# Patient Record
Sex: FEMALE | Race: BLACK OR AFRICAN AMERICAN | NOT HISPANIC OR LATINO | URBAN - METROPOLITAN AREA
[De-identification: names, ages, dates, MRNs, and addresses within clinical notes are randomized per-mention and may not be internally consistent; named-entity substitution may affect disease eponyms.]

---

## 2017-04-04 ENCOUNTER — INPATIENT (INPATIENT)
Facility: HOSPITAL | Age: 27
LOS: 6 days | Discharge: HOME | End: 2017-04-11
Attending: OBSTETRICS & GYNECOLOGY | Admitting: OBSTETRICS & GYNECOLOGY
Payer: MEDICAID

## 2017-04-04 VITALS
TEMPERATURE: 98 F | HEART RATE: 92 BPM | OXYGEN SATURATION: 100 % | DIASTOLIC BLOOD PRESSURE: 90 MMHG | RESPIRATION RATE: 16 BRPM | SYSTOLIC BLOOD PRESSURE: 140 MMHG

## 2017-04-04 DIAGNOSIS — O47.03 FALSE LABOR BEFORE 37 COMPLETED WEEKS OF GESTATION, THIRD TRIMESTER: ICD-10-CM

## 2017-04-04 LAB
ABO RH CONFIRMATION: SIGNIFICANT CHANGE UP
ALBUMIN SERPL ELPH-MCNC: 1.9 G/DL — LOW (ref 3.3–5.2)
ALP SERPL-CCNC: 392 U/L — HIGH (ref 40–120)
ALT FLD-CCNC: 32 U/L — SIGNIFICANT CHANGE UP
ANION GAP SERPL CALC-SCNC: 11 MMOL/L — SIGNIFICANT CHANGE UP (ref 5–17)
APPEARANCE UR: CLEAR — SIGNIFICANT CHANGE UP
APTT BLD: 35.3 SEC — SIGNIFICANT CHANGE UP (ref 27.5–37.4)
AST SERPL-CCNC: 46 U/L — HIGH
BACTERIA # UR AUTO: ABNORMAL
BASE EXCESS BLDCOA CALC-SCNC: -6.8 MMOL/L — SIGNIFICANT CHANGE UP (ref -11.6–0.4)
BASE EXCESS BLDCOV CALC-SCNC: -1.5 MMOL/L — SIGNIFICANT CHANGE UP (ref -9.3–0.3)
BASOPHILS # BLD AUTO: 0 K/UL — SIGNIFICANT CHANGE UP (ref 0–0.2)
BASOPHILS NFR BLD AUTO: 0.3 % — SIGNIFICANT CHANGE UP (ref 0–2)
BILIRUB SERPL-MCNC: 0.1 MG/DL — LOW (ref 0.4–2)
BILIRUB UR-MCNC: NEGATIVE — SIGNIFICANT CHANGE UP
BLD GP AB SCN SERPL QL: SIGNIFICANT CHANGE UP
BUN SERPL-MCNC: 16 MG/DL — SIGNIFICANT CHANGE UP (ref 8–20)
CALCIUM SERPL-MCNC: 8.8 MG/DL — SIGNIFICANT CHANGE UP (ref 8.6–10.2)
CHLORIDE SERPL-SCNC: 101 MMOL/L — SIGNIFICANT CHANGE UP (ref 98–107)
CO2 SERPL-SCNC: 22 MMOL/L — SIGNIFICANT CHANGE UP (ref 22–29)
COLOR SPEC: SIGNIFICANT CHANGE UP
CREAT SERPL-MCNC: 0.71 MG/DL — SIGNIFICANT CHANGE UP (ref 0.5–1.3)
DIFF PNL FLD: ABNORMAL
EOSINOPHIL # BLD AUTO: 0.1 K/UL — SIGNIFICANT CHANGE UP (ref 0–0.5)
EOSINOPHIL NFR BLD AUTO: 1.3 % — SIGNIFICANT CHANGE UP (ref 0–6)
EPI CELLS # UR: ABNORMAL
FIBRINOGEN PPP-MCNC: 634 MG/DL — HIGH (ref 310–510)
GAS PNL BLDCOV: 7.24 — SIGNIFICANT CHANGE UP (ref 7.11–7.36)
GLUCOSE SERPL-MCNC: 62 MG/DL — LOW (ref 70–115)
GLUCOSE UR QL: NEGATIVE MG/DL — SIGNIFICANT CHANGE UP
HBV SURFACE AG SERPL QL IA: SIGNIFICANT CHANGE UP
HCO3 BLDCOA-SCNC: 21 MMOL/L — SIGNIFICANT CHANGE UP (ref 15–27)
HCO3 BLDCOV-SCNC: 27 MMOL/L — HIGH (ref 17–25)
HCT VFR BLD CALC: 42 % — SIGNIFICANT CHANGE UP (ref 37–47)
HGB BLD-MCNC: 14.3 G/DL — SIGNIFICANT CHANGE UP (ref 12–16)
HIV 1 & 2 AB SERPL IA.RAPID: SIGNIFICANT CHANGE UP
INR BLD: 0.81 RATIO — LOW (ref 0.88–1.16)
KETONES UR-MCNC: NEGATIVE — SIGNIFICANT CHANGE UP
LDH SERPL L TO P-CCNC: 305 U/L — HIGH (ref 98–192)
LEUKOCYTE ESTERASE UR-ACNC: ABNORMAL
LYMPHOCYTES # BLD AUTO: 2.6 K/UL — SIGNIFICANT CHANGE UP (ref 1–4.8)
LYMPHOCYTES # BLD AUTO: 35.1 % — SIGNIFICANT CHANGE UP (ref 20–55)
MCHC RBC-ENTMCNC: 28.8 PG — SIGNIFICANT CHANGE UP (ref 27–31)
MCHC RBC-ENTMCNC: 34 G/DL — SIGNIFICANT CHANGE UP (ref 32–36)
MCV RBC AUTO: 84.7 FL — SIGNIFICANT CHANGE UP (ref 81–99)
MONOCYTES # BLD AUTO: 0.9 K/UL — HIGH (ref 0–0.8)
MONOCYTES NFR BLD AUTO: 12.3 % — HIGH (ref 3–10)
NEUTROPHILS # BLD AUTO: 3.8 K/UL — SIGNIFICANT CHANGE UP (ref 1.8–8)
NEUTROPHILS NFR BLD AUTO: 50.9 % — SIGNIFICANT CHANGE UP (ref 37–73)
NITRITE UR-MCNC: NEGATIVE — SIGNIFICANT CHANGE UP
PCO2 BLDCOA: 53.9 MMHG — SIGNIFICANT CHANGE UP (ref 32.2–65.8)
PCO2 BLDCOV: 63.2 MMHG — HIGH (ref 27–49.4)
PH BLDCOA: 7.21 — SIGNIFICANT CHANGE UP (ref 7.11–7.36)
PH UR: 6.5 — SIGNIFICANT CHANGE UP (ref 4.8–8)
PLATELET # BLD AUTO: 286 K/UL — SIGNIFICANT CHANGE UP (ref 150–400)
PO2 BLDCOA: 14.2 MMHG — LOW (ref 17.4–41)
PO2 BLDCOA: 21 MMHG — SIGNIFICANT CHANGE UP (ref 6–30)
POTASSIUM SERPL-MCNC: 4.1 MMOL/L — SIGNIFICANT CHANGE UP (ref 3.5–5.3)
POTASSIUM SERPL-SCNC: 4.1 MMOL/L — SIGNIFICANT CHANGE UP (ref 3.5–5.3)
PROT SERPL-MCNC: 6.7 G/DL — SIGNIFICANT CHANGE UP (ref 6.6–8.7)
PROT UR-MCNC: 500 MG/DL
PROTHROM AB SERPL-ACNC: 8.9 SEC — LOW (ref 9.8–12.7)
RBC # BLD: 4.96 M/UL — SIGNIFICANT CHANGE UP (ref 4.4–5.2)
RBC # FLD: 14.3 % — SIGNIFICANT CHANGE UP (ref 11–15.6)
RBC CASTS # UR COMP ASSIST: SIGNIFICANT CHANGE UP /HPF (ref 0–4)
SAO2 % BLDCOA: SIGNIFICANT CHANGE UP
SAO2 % BLDCOV: SIGNIFICANT CHANGE UP
SODIUM SERPL-SCNC: 134 MMOL/L — LOW (ref 135–145)
SP GR SPEC: 1.01 — SIGNIFICANT CHANGE UP (ref 1.01–1.02)
URATE SERPL-MCNC: 5.7 MG/DL — SIGNIFICANT CHANGE UP (ref 2.4–5.7)
UROBILINOGEN FLD QL: NEGATIVE MG/DL — SIGNIFICANT CHANGE UP
WBC # BLD: 7.5 K/UL — SIGNIFICANT CHANGE UP (ref 4.8–10.8)
WBC # FLD AUTO: 7.5 K/UL — SIGNIFICANT CHANGE UP (ref 4.8–10.8)
WBC UR QL: SIGNIFICANT CHANGE UP

## 2017-04-04 PROCEDURE — 88307 TISSUE EXAM BY PATHOLOGIST: CPT | Mod: 26

## 2017-04-04 RX ORDER — ONDANSETRON 8 MG/1
4 TABLET, FILM COATED ORAL EVERY 6 HOURS
Qty: 0 | Refills: 0 | Status: DISCONTINUED | OUTPATIENT
Start: 2017-04-06 | End: 2017-04-11

## 2017-04-04 RX ORDER — OXYCODONE HYDROCHLORIDE 5 MG/1
10 TABLET ORAL
Qty: 0 | Refills: 0 | Status: DISCONTINUED | OUTPATIENT
Start: 2017-04-06 | End: 2017-04-11

## 2017-04-04 RX ORDER — LABETALOL HCL 100 MG
20 TABLET ORAL ONCE
Qty: 0 | Refills: 0 | Status: COMPLETED | OUTPATIENT
Start: 2017-04-04 | End: 2017-04-04

## 2017-04-04 RX ORDER — HYDRALAZINE HCL 50 MG
10 TABLET ORAL ONCE
Qty: 0 | Refills: 0 | Status: COMPLETED | OUTPATIENT
Start: 2017-04-04 | End: 2017-04-04

## 2017-04-04 RX ORDER — NALOXONE HYDROCHLORIDE 4 MG/.1ML
0.1 SPRAY NASAL
Qty: 0 | Refills: 0 | Status: DISCONTINUED | OUTPATIENT
Start: 2017-04-06 | End: 2017-04-11

## 2017-04-04 RX ORDER — MAGNESIUM SULFATE 500 MG/ML
4 VIAL (ML) INJECTION ONCE
Qty: 0 | Refills: 0 | Status: COMPLETED | OUTPATIENT
Start: 2017-04-04 | End: 2017-04-04

## 2017-04-04 RX ORDER — SODIUM CHLORIDE 9 MG/ML
1000 INJECTION, SOLUTION INTRAVENOUS
Qty: 0 | Refills: 0 | Status: DISCONTINUED | OUTPATIENT
Start: 2017-04-06 | End: 2017-04-06

## 2017-04-04 RX ORDER — FERROUS SULFATE 325(65) MG
325 TABLET ORAL DAILY
Qty: 0 | Refills: 0 | Status: DISCONTINUED | OUTPATIENT
Start: 2017-04-06 | End: 2017-04-11

## 2017-04-04 RX ORDER — SIMETHICONE 80 MG/1
80 TABLET, CHEWABLE ORAL EVERY 4 HOURS
Qty: 0 | Refills: 0 | Status: DISCONTINUED | OUTPATIENT
Start: 2017-04-06 | End: 2017-04-11

## 2017-04-04 RX ORDER — TETANUS TOXOID, REDUCED DIPHTHERIA TOXOID AND ACELLULAR PERTUSSIS VACCINE, ADSORBED 5; 2.5; 8; 8; 2.5 [IU]/.5ML; [IU]/.5ML; UG/.5ML; UG/.5ML; UG/.5ML
0.5 SUSPENSION INTRAMUSCULAR ONCE
Qty: 0 | Refills: 0 | Status: COMPLETED | OUTPATIENT
Start: 2017-04-06

## 2017-04-04 RX ORDER — ACETAMINOPHEN 500 MG
1000 TABLET ORAL ONCE
Qty: 0 | Refills: 0 | Status: DISCONTINUED | OUTPATIENT
Start: 2017-04-06 | End: 2017-04-11

## 2017-04-04 RX ORDER — SODIUM CHLORIDE 9 MG/ML
1000 INJECTION, SOLUTION INTRAVENOUS
Qty: 0 | Refills: 0 | Status: DISCONTINUED | OUTPATIENT
Start: 2017-04-04 | End: 2017-04-06

## 2017-04-04 RX ORDER — OXYTOCIN 10 UNIT/ML
41.67 VIAL (ML) INJECTION
Qty: 20 | Refills: 0 | Status: DISCONTINUED | OUTPATIENT
Start: 2017-04-06 | End: 2017-04-11

## 2017-04-04 RX ORDER — HYDRALAZINE HCL 50 MG
5 TABLET ORAL DAILY
Qty: 0 | Refills: 0 | Status: DISCONTINUED | OUTPATIENT
Start: 2017-04-04 | End: 2017-04-04

## 2017-04-04 RX ORDER — DIPHENHYDRAMINE HCL 50 MG
25 CAPSULE ORAL EVERY 4 HOURS
Qty: 0 | Refills: 0 | Status: DISCONTINUED | OUTPATIENT
Start: 2017-04-06 | End: 2017-04-11

## 2017-04-04 RX ORDER — SODIUM CHLORIDE 9 MG/ML
500 INJECTION, SOLUTION INTRAVENOUS ONCE
Qty: 0 | Refills: 0 | Status: COMPLETED | OUTPATIENT
Start: 2017-04-04 | End: 2017-04-04

## 2017-04-04 RX ORDER — OXYCODONE HYDROCHLORIDE 5 MG/1
5 TABLET ORAL
Qty: 0 | Refills: 0 | Status: DISCONTINUED | OUTPATIENT
Start: 2017-04-06 | End: 2017-04-11

## 2017-04-04 RX ORDER — OXYTOCIN 10 UNIT/ML
333.33 VIAL (ML) INJECTION
Qty: 20 | Refills: 0 | Status: DISCONTINUED | OUTPATIENT
Start: 2017-04-04 | End: 2017-04-11

## 2017-04-04 RX ORDER — OXYTOCIN 10 UNIT/ML
41.67 VIAL (ML) INJECTION
Qty: 20 | Refills: 0 | Status: DISCONTINUED | OUTPATIENT
Start: 2017-04-04 | End: 2017-04-11

## 2017-04-04 RX ORDER — HYDROMORPHONE HYDROCHLORIDE 2 MG/ML
1 INJECTION INTRAMUSCULAR; INTRAVENOUS; SUBCUTANEOUS
Qty: 0 | Refills: 0 | Status: DISCONTINUED | OUTPATIENT
Start: 2017-04-06 | End: 2017-04-11

## 2017-04-04 RX ORDER — DOCUSATE SODIUM 100 MG
100 CAPSULE ORAL
Qty: 0 | Refills: 0 | Status: DISCONTINUED | OUTPATIENT
Start: 2017-04-06 | End: 2017-04-11

## 2017-04-04 RX ORDER — MAGNESIUM SULFATE 500 MG/ML
1 VIAL (ML) INJECTION
Qty: 40 | Refills: 0 | Status: COMPLETED | OUTPATIENT
Start: 2017-04-04 | End: 2017-04-05

## 2017-04-04 RX ORDER — CITRIC ACID/SODIUM CITRATE 300-500 MG
30 SOLUTION, ORAL ORAL ONCE
Qty: 0 | Refills: 0 | Status: COMPLETED | OUTPATIENT
Start: 2017-04-04 | End: 2017-04-04

## 2017-04-04 RX ORDER — HYDRALAZINE HCL 50 MG
5 TABLET ORAL ONCE
Qty: 0 | Refills: 0 | Status: COMPLETED | OUTPATIENT
Start: 2017-04-04 | End: 2017-04-04

## 2017-04-04 RX ORDER — HYDROMORPHONE HYDROCHLORIDE 2 MG/ML
30 INJECTION INTRAMUSCULAR; INTRAVENOUS; SUBCUTANEOUS
Qty: 0 | Refills: 0 | Status: DISCONTINUED | OUTPATIENT
Start: 2017-04-04 | End: 2017-04-05

## 2017-04-04 RX ORDER — DIPHENHYDRAMINE HCL 50 MG
25 CAPSULE ORAL EVERY 6 HOURS
Qty: 0 | Refills: 0 | Status: DISCONTINUED | OUTPATIENT
Start: 2017-04-06 | End: 2017-04-11

## 2017-04-04 RX ORDER — OXYTOCIN 10 UNIT/ML
333.33 VIAL (ML) INJECTION
Qty: 20 | Refills: 0 | Status: COMPLETED | OUTPATIENT
Start: 2017-04-04

## 2017-04-04 RX ORDER — OXYTOCIN 10 UNIT/ML
333.33 VIAL (ML) INJECTION
Qty: 20 | Refills: 0 | Status: DISCONTINUED | OUTPATIENT
Start: 2017-04-06 | End: 2017-04-11

## 2017-04-04 RX ORDER — ACETAMINOPHEN 500 MG
650 TABLET ORAL EVERY 6 HOURS
Qty: 0 | Refills: 0 | Status: DISCONTINUED | OUTPATIENT
Start: 2017-04-06 | End: 2017-04-11

## 2017-04-04 RX ORDER — IBUPROFEN 200 MG
600 TABLET ORAL EVERY 6 HOURS
Qty: 0 | Refills: 0 | Status: DISCONTINUED | OUTPATIENT
Start: 2017-04-06 | End: 2017-04-11

## 2017-04-04 RX ORDER — GLYCERIN ADULT
1 SUPPOSITORY, RECTAL RECTAL AT BEDTIME
Qty: 0 | Refills: 0 | Status: DISCONTINUED | OUTPATIENT
Start: 2017-04-06 | End: 2017-04-11

## 2017-04-04 RX ORDER — LANOLIN
1 OINTMENT (GRAM) TOPICAL
Qty: 0 | Refills: 0 | Status: DISCONTINUED | OUTPATIENT
Start: 2017-04-06 | End: 2017-04-11

## 2017-04-04 RX ADMIN — Medication 12 MILLIGRAM(S): at 18:00

## 2017-04-04 RX ADMIN — SODIUM CHLORIDE 1000 MILLILITER(S): 9 INJECTION, SOLUTION INTRAVENOUS at 18:02

## 2017-04-04 RX ADMIN — Medication 125 MILLIUNIT(S)/MIN: at 21:01

## 2017-04-04 RX ADMIN — Medication 50 GM/HR: at 20:04

## 2017-04-04 RX ADMIN — Medication 10 MILLIGRAM(S): at 18:07

## 2017-04-04 RX ADMIN — Medication 1000 MILLIUNIT(S)/MIN: at 18:32

## 2017-04-04 RX ADMIN — Medication 300 GRAM(S): at 18:04

## 2017-04-04 RX ADMIN — Medication 5 MILLIGRAM(S): at 20:55

## 2017-04-04 RX ADMIN — Medication 10 MILLIGRAM(S): at 18:03

## 2017-04-04 RX ADMIN — Medication 20 MILLIGRAM(S): at 22:35

## 2017-04-05 DIAGNOSIS — O09.899 SUPERVISION OF OTHER HIGH RISK PREGNANCIES, UNSPECIFIED TRIMESTER: ICD-10-CM

## 2017-04-05 DIAGNOSIS — O45.90 PREMATURE SEPARATION OF PLACENTA, UNSPECIFIED, UNSPECIFIED TRIMESTER: ICD-10-CM

## 2017-04-05 DIAGNOSIS — O14.10 SEVERE PRE-ECLAMPSIA, UNSPECIFIED TRIMESTER: ICD-10-CM

## 2017-04-05 DIAGNOSIS — O14.93 UNSPECIFIED PRE-ECLAMPSIA, THIRD TRIMESTER: ICD-10-CM

## 2017-04-05 LAB
ALBUMIN SERPL ELPH-MCNC: 1.6 G/DL — LOW (ref 3.3–5.2)
ALBUMIN SERPL ELPH-MCNC: 1.9 G/DL — LOW (ref 3.3–5.2)
ALP SERPL-CCNC: 334 U/L — HIGH (ref 40–120)
ALP SERPL-CCNC: 368 U/L — HIGH (ref 40–120)
ALT FLD-CCNC: 27 U/L — SIGNIFICANT CHANGE UP
ALT FLD-CCNC: 29 U/L — SIGNIFICANT CHANGE UP
AMPHET UR-MCNC: NEGATIVE — SIGNIFICANT CHANGE UP
ANION GAP SERPL CALC-SCNC: 10 MMOL/L — SIGNIFICANT CHANGE UP (ref 5–17)
ANION GAP SERPL CALC-SCNC: 10 MMOL/L — SIGNIFICANT CHANGE UP (ref 5–17)
APPEARANCE UR: CLEAR — SIGNIFICANT CHANGE UP
APTT BLD: 29.7 SEC — SIGNIFICANT CHANGE UP (ref 27.5–37.4)
APTT BLD: 30.8 SEC — SIGNIFICANT CHANGE UP (ref 27.5–37.4)
AST SERPL-CCNC: 46 U/L — HIGH
AST SERPL-CCNC: 49 U/L — HIGH
BARBITURATES UR SCN-MCNC: NEGATIVE — SIGNIFICANT CHANGE UP
BASOPHILS # BLD AUTO: 0 K/UL — SIGNIFICANT CHANGE UP (ref 0–0.2)
BASOPHILS # BLD AUTO: 0 K/UL — SIGNIFICANT CHANGE UP (ref 0–0.2)
BASOPHILS NFR BLD AUTO: 0.1 % — SIGNIFICANT CHANGE UP (ref 0–2)
BASOPHILS NFR BLD AUTO: 0.1 % — SIGNIFICANT CHANGE UP (ref 0–2)
BENZODIAZ UR-MCNC: NEGATIVE — SIGNIFICANT CHANGE UP
BILIRUB SERPL-MCNC: 0.1 MG/DL — LOW (ref 0.4–2)
BILIRUB SERPL-MCNC: 0.1 MG/DL — LOW (ref 0.4–2)
BILIRUB UR-MCNC: NEGATIVE — SIGNIFICANT CHANGE UP
BUN SERPL-MCNC: 15 MG/DL — SIGNIFICANT CHANGE UP (ref 8–20)
BUN SERPL-MCNC: 16 MG/DL — SIGNIFICANT CHANGE UP (ref 8–20)
CALCIUM SERPL-MCNC: 7.8 MG/DL — LOW (ref 8.6–10.2)
CALCIUM SERPL-MCNC: 8.3 MG/DL — LOW (ref 8.6–10.2)
CHLORIDE SERPL-SCNC: 101 MMOL/L — SIGNIFICANT CHANGE UP (ref 98–107)
CHLORIDE SERPL-SCNC: 99 MMOL/L — SIGNIFICANT CHANGE UP (ref 98–107)
CO2 SERPL-SCNC: 20 MMOL/L — LOW (ref 22–29)
CO2 SERPL-SCNC: 21 MMOL/L — LOW (ref 22–29)
COCAINE METAB.OTHER UR-MCNC: NEGATIVE — SIGNIFICANT CHANGE UP
COLOR SPEC: YELLOW — SIGNIFICANT CHANGE UP
CREAT SERPL-MCNC: 0.58 MG/DL — SIGNIFICANT CHANGE UP (ref 0.5–1.3)
CREAT SERPL-MCNC: 0.62 MG/DL — SIGNIFICANT CHANGE UP (ref 0.5–1.3)
DIFF PNL FLD: ABNORMAL
FIBRINOGEN PPP-MCNC: 642 MG/DL — HIGH (ref 310–510)
FIBRINOGEN PPP-MCNC: 663 MG/DL — HIGH (ref 310–510)
GLUCOSE SERPL-MCNC: 109 MG/DL — SIGNIFICANT CHANGE UP (ref 70–115)
GLUCOSE SERPL-MCNC: 142 MG/DL — HIGH (ref 70–115)
GLUCOSE UR QL: NEGATIVE MG/DL — SIGNIFICANT CHANGE UP
HCT VFR BLD CALC: 40.9 % — SIGNIFICANT CHANGE UP (ref 37–47)
HCT VFR BLD CALC: 41.2 % — SIGNIFICANT CHANGE UP (ref 37–47)
HGB BLD-MCNC: 13.9 G/DL — SIGNIFICANT CHANGE UP (ref 12–16)
HGB BLD-MCNC: 14.1 G/DL — SIGNIFICANT CHANGE UP (ref 12–16)
INR BLD: 0.8 RATIO — LOW (ref 0.88–1.16)
INR BLD: 0.82 RATIO — LOW (ref 0.88–1.16)
KETONES UR-MCNC: NEGATIVE — SIGNIFICANT CHANGE UP
LDH SERPL L TO P-CCNC: 353 U/L — HIGH (ref 98–192)
LDH SERPL L TO P-CCNC: 395 U/L — HIGH (ref 98–192)
LEUKOCYTE ESTERASE UR-ACNC: NEGATIVE — SIGNIFICANT CHANGE UP
LYMPHOCYTES # BLD AUTO: 0.8 K/UL — LOW (ref 1–4.8)
LYMPHOCYTES # BLD AUTO: 1.4 K/UL — SIGNIFICANT CHANGE UP (ref 1–4.8)
LYMPHOCYTES # BLD AUTO: 5.2 % — LOW (ref 20–55)
LYMPHOCYTES # BLD AUTO: 7.8 % — LOW (ref 20–55)
MAGNESIUM SERPL-MCNC: 6.2 MG/DL — CRITICAL HIGH (ref 1.8–2.5)
MAGNESIUM SERPL-MCNC: 6.2 MG/DL — CRITICAL HIGH (ref 1.8–2.5)
MAGNESIUM SERPL-MCNC: 6.7 MG/DL — CRITICAL HIGH (ref 1.8–2.5)
MAGNESIUM SERPL-MCNC: 7.3 MG/DL — CRITICAL HIGH (ref 1.8–2.5)
MCHC RBC-ENTMCNC: 28.9 PG — SIGNIFICANT CHANGE UP (ref 27–31)
MCHC RBC-ENTMCNC: 29 PG — SIGNIFICANT CHANGE UP (ref 27–31)
MCHC RBC-ENTMCNC: 34 G/DL — SIGNIFICANT CHANGE UP (ref 32–36)
MCHC RBC-ENTMCNC: 34.2 G/DL — SIGNIFICANT CHANGE UP (ref 32–36)
MCV RBC AUTO: 84.6 FL — SIGNIFICANT CHANGE UP (ref 81–99)
MCV RBC AUTO: 85 FL — SIGNIFICANT CHANGE UP (ref 81–99)
METHADONE UR-MCNC: NEGATIVE — SIGNIFICANT CHANGE UP
MONOCYTES # BLD AUTO: 0.1 K/UL — SIGNIFICANT CHANGE UP (ref 0–0.8)
MONOCYTES # BLD AUTO: 0.5 K/UL — SIGNIFICANT CHANGE UP (ref 0–0.8)
MONOCYTES NFR BLD AUTO: 0.8 % — LOW (ref 3–10)
MONOCYTES NFR BLD AUTO: 2.6 % — LOW (ref 3–10)
NEUTROPHILS # BLD AUTO: 14.3 K/UL — HIGH (ref 1.8–8)
NEUTROPHILS # BLD AUTO: 16.4 K/UL — HIGH (ref 1.8–8)
NEUTROPHILS NFR BLD AUTO: 89 % — HIGH (ref 37–73)
NEUTROPHILS NFR BLD AUTO: 93.6 % — HIGH (ref 37–73)
NITRITE UR-MCNC: NEGATIVE — SIGNIFICANT CHANGE UP
OPIATES UR-MCNC: NEGATIVE — SIGNIFICANT CHANGE UP
PCP SPEC-MCNC: SIGNIFICANT CHANGE UP
PCP UR-MCNC: NEGATIVE — SIGNIFICANT CHANGE UP
PH UR: 5 — SIGNIFICANT CHANGE UP (ref 4.8–8)
PLATELET # BLD AUTO: 254 K/UL — SIGNIFICANT CHANGE UP (ref 150–400)
PLATELET # BLD AUTO: 290 K/UL — SIGNIFICANT CHANGE UP (ref 150–400)
POTASSIUM SERPL-MCNC: 4.3 MMOL/L — SIGNIFICANT CHANGE UP (ref 3.5–5.3)
POTASSIUM SERPL-MCNC: 4.3 MMOL/L — SIGNIFICANT CHANGE UP (ref 3.5–5.3)
POTASSIUM SERPL-SCNC: 4.3 MMOL/L — SIGNIFICANT CHANGE UP (ref 3.5–5.3)
POTASSIUM SERPL-SCNC: 4.3 MMOL/L — SIGNIFICANT CHANGE UP (ref 3.5–5.3)
PROT SERPL-MCNC: 6 G/DL — LOW (ref 6.6–8.7)
PROT SERPL-MCNC: 6.2 G/DL — LOW (ref 6.6–8.7)
PROT UR-MCNC: 100 MG/DL
PROTHROM AB SERPL-ACNC: 8.8 SEC — LOW (ref 9.8–12.7)
PROTHROM AB SERPL-ACNC: 9 SEC — LOW (ref 9.8–12.7)
RBC # BLD: 4.81 M/UL — SIGNIFICANT CHANGE UP (ref 4.4–5.2)
RBC # BLD: 4.87 M/UL — SIGNIFICANT CHANGE UP (ref 4.4–5.2)
RBC # FLD: 14.1 % — SIGNIFICANT CHANGE UP (ref 11–15.6)
RBC # FLD: 14.3 % — SIGNIFICANT CHANGE UP (ref 11–15.6)
RBC CASTS # UR COMP ASSIST: SIGNIFICANT CHANGE UP /HPF (ref 0–4)
RPR SERPL-ACNC: SIGNIFICANT CHANGE UP
SODIUM SERPL-SCNC: 130 MMOL/L — LOW (ref 135–145)
SODIUM SERPL-SCNC: 131 MMOL/L — LOW (ref 135–145)
SP GR SPEC: 1.02 — SIGNIFICANT CHANGE UP (ref 1.01–1.02)
THC UR QL: NEGATIVE — SIGNIFICANT CHANGE UP
URATE SERPL-MCNC: 5.7 MG/DL — SIGNIFICANT CHANGE UP (ref 2.4–5.7)
URATE SERPL-MCNC: 5.9 MG/DL — HIGH (ref 2.4–5.7)
UROBILINOGEN FLD QL: NEGATIVE MG/DL — SIGNIFICANT CHANGE UP
WBC # BLD: 15.3 K/UL — HIGH (ref 4.8–10.8)
WBC # BLD: 18.4 K/UL — HIGH (ref 4.8–10.8)
WBC # FLD AUTO: 15.3 K/UL — HIGH (ref 4.8–10.8)
WBC # FLD AUTO: 18.4 K/UL — HIGH (ref 4.8–10.8)
WBC UR QL: SIGNIFICANT CHANGE UP

## 2017-04-05 PROCEDURE — 99235 HOSP IP/OBS SAME DATE MOD 70: CPT

## 2017-04-05 RX ORDER — HYDRALAZINE HCL 50 MG
5 TABLET ORAL ONCE
Qty: 0 | Refills: 0 | Status: COMPLETED | OUTPATIENT
Start: 2017-04-05 | End: 2017-04-05

## 2017-04-05 RX ORDER — NIFEDIPINE 30 MG
20 TABLET, EXTENDED RELEASE 24 HR ORAL ONCE
Qty: 0 | Refills: 0 | Status: COMPLETED | OUTPATIENT
Start: 2017-04-05 | End: 2017-04-05

## 2017-04-05 RX ORDER — HYDRALAZINE HCL 50 MG
10 TABLET ORAL ONCE
Qty: 0 | Refills: 0 | Status: COMPLETED | OUTPATIENT
Start: 2017-04-05 | End: 2017-04-05

## 2017-04-05 RX ORDER — NIFEDIPINE 30 MG
20 TABLET, EXTENDED RELEASE 24 HR ORAL EVERY 8 HOURS
Qty: 0 | Refills: 0 | Status: DISCONTINUED | OUTPATIENT
Start: 2017-04-05 | End: 2017-04-05

## 2017-04-05 RX ORDER — LABETALOL HCL 100 MG
200 TABLET ORAL EVERY 12 HOURS
Qty: 0 | Refills: 0 | Status: DISCONTINUED | OUTPATIENT
Start: 2017-04-05 | End: 2017-04-07

## 2017-04-05 RX ORDER — HYDRALAZINE HCL 50 MG
10 TABLET ORAL ONCE
Qty: 0 | Refills: 0 | Status: DISCONTINUED | OUTPATIENT
Start: 2017-04-05 | End: 2017-04-05

## 2017-04-05 RX ORDER — NIFEDIPINE 30 MG
20 TABLET, EXTENDED RELEASE 24 HR ORAL EVERY 6 HOURS
Qty: 0 | Refills: 0 | Status: DISCONTINUED | OUTPATIENT
Start: 2017-04-05 | End: 2017-04-07

## 2017-04-05 RX ADMIN — Medication 10 MILLIGRAM(S): at 00:20

## 2017-04-05 RX ADMIN — Medication 20 MILLIGRAM(S): at 19:26

## 2017-04-05 RX ADMIN — Medication 20 MILLIGRAM(S): at 03:54

## 2017-04-05 RX ADMIN — HYDROMORPHONE HYDROCHLORIDE 30 MILLILITER(S): 2 INJECTION INTRAMUSCULAR; INTRAVENOUS; SUBCUTANEOUS at 07:13

## 2017-04-05 RX ADMIN — SODIUM CHLORIDE 75 MILLILITER(S): 9 INJECTION, SOLUTION INTRAVENOUS at 05:18

## 2017-04-05 RX ADMIN — Medication 200 MILLIGRAM(S): at 21:06

## 2017-04-05 RX ADMIN — Medication 20 MILLIGRAM(S): at 11:35

## 2017-04-05 RX ADMIN — Medication 20 MILLIGRAM(S): at 00:05

## 2017-04-05 RX ADMIN — Medication 5 MILLIGRAM(S): at 19:14

## 2017-04-05 RX ADMIN — Medication 5 MILLIGRAM(S): at 20:53

## 2017-04-05 NOTE — PROGRESS NOTE ADULT - SUBJECTIVE AND OBJECTIVE BOX
No complaints post GETA  for C/Section.    Denies sore throat or PONV   /94 HR 68  Pt is on Mag Sulfate 4grams  Has not ambulated.  Pain management satisfactory No complaints post GETA  for C/Section.    Denies sore throat or PONV   /94 HR 68  Pt is on Mag Sulfate 1 gram p/hour  Has not ambulated.  Pain management satisfactory

## 2017-04-05 NOTE — PROGRESS NOTE ADULT - ATTENDING COMMENTS
I evaluated the patient and developed the plan. I evaluated the patient and developed the plan.    Addendum: Fetal Rh status- negative, maternal Rhogam administration is not indicated.

## 2017-04-05 NOTE — CONSULT NOTE ADULT - SUBJECTIVE AND OBJECTIVE BOX
REASON FOR CONSULT: Pre-eclampsia (post ) and hypertension     CONSULT REQUESTED BY: Toy        [facility] COURSE: 26F with pre-eclampsia, s/p Csection on 17 @ 1832    Events last 24 hours: patient was noted to be hypertensive overnight, and was given multiple pushes of hydralazine and 1 IVP of labetalol. Patient was given Po Procardia at 3am this morning    -On my arrival to Labor and Delivery, I found patient resting/sleeping comfortably in bed. With Rn at bedside who speaks creole, patient told me she had no headaches, and no vision changes, and other wise felt fine.    Vitals on my arrival were /88 SaO2 100%, HR 70    patient also on magnesium and oxytocin infusions.     PAST MEDICAL & SURGICAL HISTORY:    Allergies    No Known Allergies    Intolerances      FAMILY HISTORY:      Review of Systems:  CONSTITUTIONAL: No fever, chills, or fatigue  EYES: No eye pain, visual disturbances, or discharge  ENMT:  No difficulty hearing, tinnitus, vertigo; No sinus or throat pain  NECK: No pain or stiffness  RESPIRATORY: No cough, wheezing, chills or hemoptysis; No shortness of breath  CARDIOVASCULAR: No chest pain, palpitations, dizziness, or leg swelling  GASTROINTESTINAL: No abdominal or epigastric pain. No nausea, vomiting, or hematemesis; No diarrhea or constipation. No melena or hematochezia.  GENITOURINARY: No dysuria, frequency, hematuria, or incontinence  NEUROLOGICAL: No headaches, memory loss, loss of strength, numbness, or tremors  SKIN: No itching, burning, rashes, or lesions   MUSCULOSKELETAL: No joint pain or swelling; No muscle, back, or extremity pain  PSYCHIATRIC: No depression, anxiety, mood swings, or difficulty sleeping      Medications:        magnesium sulfate Infusion 2Gm/Hr IV Continuous <Continuous>  HYDROmorphone PCA (1 mG/mL) 30milliLiter(s) PCA Continuous PCA Continuous          oxytocin Infusion 41.667milliUNIT(s)/Min IV Continuous <Continuous>  oxytocin Infusion 333.333milliUNIT(s)/Min IV Continuous <Continuous>    betamethasone Injectable 12milliGRAM(s) IntraMuscular every 24 hours    lactated ringers. 1000milliLiter(s) IV Continuous <Continuous>                ICU Vital Signs Last 24 Hrs  T(C): 36.8, Max: 36.8 ( @ 19:19)  T(F): 98.2, Max: 98.2 ( @ 19:19)  HR: 70 (70 - 118)  BP: 146/88 (137/87 - 176/111)  BP(mean): --  ABP: --  ABP(mean): --  RR: 15 (12 - 16)  SpO2: 100% (100% - 100%)    Vital Signs Last 24 Hrs  T(C): 36.8, Max: 36.8 ( @ 19:19)  T(F): 98.2, Max: 98.2 ( @ 19:19)  HR: 70 (70 - 118)  BP: 146/88 (137/87 - 176/111)  BP(mean): --  RR: 15 (12 - 16)  SpO2: 100% (100% - 100%)        I&O's Detail    I & Os for current day (as of 2017 04:59)  =============================================  IN:    Lactated Ringers IV Bolus: 1000 ml    oxytocin Infusion: 1000 ml    Total IN: 2000 ml  ---------------------------------------------  OUT:    Voided: 575 ml    Total OUT: 575 ml  ---------------------------------------------  Total NET: 1425 ml        LABS:                        13.9   15.3  )-----------( 254      ( 2017 01:12 )             40.9     2017 01:12    130    |  99     |  16.0   ----------------------------<  142    4.3     |  21.0   |  0.58     Ca    8.3        2017 01:12  Mg     6.2       2017 01:12    TPro  6.2    /  Alb  1.9    /  TBili  0.1    /  DBili  x      /  AST  46     /  ALT  29     /  AlkPhos  368    2017 01:12          CAPILLARY BLOOD GLUCOSE    PT/INR - ( 2017 01:12 )   PT: 8.8 sec;   INR: 0.80 ratio         PTT - ( 2017 01:12 )  PTT:30.8 sec  Urinalysis Basic - ( 2017 19:01 )    Color: Pale Yellow / Appearance: Clear / S.010 / pH: x  Gluc: x / Ketone: Negative  / Bili: Negative / Urobili: Negative mg/dL   Blood: x / Protein: 500 mg/dL / Nitrite: Negative   Leuk Esterase: Trace / RBC: 0-2 /HPF / WBC 0-2   Sq Epi: x / Non Sq Epi: Moderate / Bacteria: Few      CULTURES:      Physical Examination:    General: No acute distress.  Alert, oriented, interactive, nonfocal    HEENT: Pupils equal, reactive to light.  Symmetric.    PULM: Clear to auscultation bilaterally, no significant sputum production    CVS: Regular rate and rhythm, no murmurs, rubs, or gallops    ABD: Soft, nondistended, nontender, normoactive bowel sounds, no masses    EXT: No edema, nontender    SKIN: Warm and well perfused, no rashes noted.        CRITICAL CARE TIME SPENT: 40 minutes

## 2017-04-05 NOTE — CONSULT NOTE ADULT - ASSESSMENT
26F w/ pre-eclampsia now S/P  on 17 at 1832. Periods of hypertension overnight, IVP medications given (hydralazine x 3, and Labetalol x 1) and now with PO procardia added.  Blood pressure is currently controlled at 146/88 26F w/ pre-eclampsia now S/P  on 17 at 1832. Periods of hypertension overnight, IVP medications given (hydralazine x 3, and Labetalol x 1) and now with PO procardia added.  Blood pressure is currently controlled at 146/88  `

## 2017-04-05 NOTE — CONSULT NOTE ADULT - ATTENDING COMMENTS
26 year old female with post  noted to be hypertensive with + Urine protein.  Likely pre-eclampsia. Pt SBP maxed out at 170, now down to <140 with medical management.   Current vitals at 5:20 136/84 pt has responded appropriately with oral/IV meds.    will recommend to monitor BP in Post Delivery unit and reconsult if SBP > 170-180.

## 2017-04-05 NOTE — PROGRESS NOTE ADULT - SUBJECTIVE AND OBJECTIVE BOX
Postpartum Note,  Section   She is a  26y woman who is now post-operative day #1    Subjective:  patient with no complaints at this time      Vital Signs Last 24 Hrs  T(C): 36.9, Max: 37 ( @ 04:25)  T(F): 98.4, Max: 98.6 ( @ 04:25)  HR: 71 (58 - 118)  BP: 133/85 (112/70 - 176/111)  BP(mean): --  RR: 16 (12 - 18)  SpO2: 99% (99% - 100%)    Physical:  Lungs: Normal  Heart: Regular rate and rhythm  Abdomen: Soft, nontender, no distension , firm uterine fundus, dressing removed oozing noted at the right side which stopped  gauze and telfa replaced  Pelvic: Normal lochia noted  Ext: No DVT signs, warm extremities, normal pulses    LABS:                        14.1   18.4  )-----------( 290      ( 2017 08:04 )             41.2     04-05    131<L>  |  101  |  15.0  ----------------------------<  109  4.3   |  20.0<L>  |  0.62    Ca    7.8<L>      2017 08:04  Mg     6.7     04-05    TPro  6.0<L>  /  Alb  1.6<L>  /  TBili  0.1<L>  /  DBili  x   /  AST  49<H>  /  ALT  27  /  AlkPhos  334<H>  04-05    PT/INR - ( 2017 08:04 )   PT: 9.0 sec;   INR: 0.82 ratio         PTT - ( 2017 08:04 )  PTT:29.7 sec  Urinalysis Basic - ( 2017 10:45 )    Color: Yellow / Appearance: Clear / S.025 / pH: x  Gluc: x / Ketone: Negative  / Bili: Negative / Urobili: Negative mg/dL   Blood: x / Protein: 100 mg/dL / Nitrite: Negative   Leuk Esterase: Negative / RBC: 0-2 /HPF / WBC 0-2   Sq Epi: x / Non Sq Epi: x / Bacteria: x        Allergies    No Known Allergies    Intolerances      MEDICATIONS  (STANDING):  magnesium sulfate Infusion 1Gm/Hr IV Continuous <Continuous>  lactated ringers. 1000milliLiter(s) IV Continuous <Continuous>  oxytocin Infusion 41.667milliUNIT(s)/Min IV Continuous <Continuous>  oxytocin Infusion 333.333milliUNIT(s)/Min IV Continuous <Continuous>  NIFEdipine IR 20milliGRAM(s) Oral every 8 hours    MEDICATIONS  (PRN):  oxyCODONE  5 mG/acetaminophen 325 mG 2Tablet(s) Oral every 6 hours PRN Severe Pain (7 - 10)      RADIOLOGY & ADDITIONAL TESTS:    Assessment and Plan   Section Day1  continue magnesium until 6pm,  repeat magnesium level 7.3- 6.7 ( patient now on 1gm magnesium)  breast pump requested to bedside  blood pressures doing well  OOB to bed chair

## 2017-04-05 NOTE — CHART NOTE - NSCHARTNOTEFT_GEN_A_CORE
PGY2 Service note    Called by RN for BP 160s/100s,  Patient asymptomatic, denies headache, SOB, chest pain, abdominal pain.   NAD  CTAB  RRR  minimal pedal edema  DTR +2    A/P:  -hydralazine 5mg IVP x 1 dose  -Increase interval of Procardia to 20mg PO q6h with holding parameters    DW Dr Rodriges

## 2017-04-05 NOTE — CHART NOTE - NSCHARTNOTEFT_GEN_A_CORE
PGY2 Service note    Patient seen and evaluated at bedside, s/p  for severe pre-eclampsia.   Patient doing well post op, denies any headaches, visual complaints, sob, chest pain, abdominal pain, edema.     VS: bp - 136/86 hr- 62    Gen: NAD  CVS: RRR  Lungs: CTAB  Abd: soft, minimal tenderness, uterus is firm.  Ext: no edema  Neuro: +2 DTR    A/P:  27 yo  s/p  for severe pre-eclampsia at 32 wks, POD# 0  -Patient currently on Mg, last level 7.3  -Mg changed to 1gm/hr  -Will repeat Mg level every 6 hours  -routine post op postpartum care    Will continue to follow closely

## 2017-04-05 NOTE — PROGRESS NOTE ADULT - PROBLEM SELECTOR PLAN 4
Rhogam for Rh isoimmunization prophylaxis Rhogam for Rh isoimmunization prophylaxis if fetal Rh status is positive.

## 2017-04-05 NOTE — PROGRESS NOTE ADULT - SUBJECTIVE AND OBJECTIVE BOX
pod#1  s/p primary low transverse c section due to severe preeclampsia ( severe BP, with placenta abruption).  Patient seen and evaluated at bedside,  Patient denies headaches, visual disturnance or epigastric pain.  She rest in bed comfortably, pain is well controlled with PCA      Afebrile, BP in normal ranges, SaO2 99% room air,   Not in distress    Heart regular rate, normal s1 s2  Lungs clear  Abd soft, minimal tenderness consistent with post-op changes, uterus is firm. Incision dressing clean and dry.   No calf tenderness, SCD in situ  DTR 2+  Lochia mild    Preeclampsia labs reviewed. pod#1  s/p primary low transverse c section due to severe preeclampsia ( severe BP, with placenta abruption).  Patient seen and evaluated at bedside,  Patient denies headaches, visual disturnance or epigastric pain.  She rest in bed comfortably, pain is well controlled with PCA      Afebrile, BP in normal ranges, SaO2 99% room air,   Not in distress    Heart regular rate, normal s1 s2  Lungs clear  Abd soft, minimal tenderness consistent with post-op changes, uterus is firm. Incision dressing clean and dry.   No calf tenderness, SCD in situ  DTR 2+  Lochia mild    Preeclampsia labs reviewed.   Blood Rh neg, Antibody neg. pod#0  s/p primary low transverse c section due to severe preeclampsia ( severe BP, with placenta abruption).  Patient seen and evaluated at bedside,  Patient denies headaches, visual disturnance or epigastric pain.  She rest in bed comfortably, pain is well controlled with PCA      Afebrile, BP in normal ranges, SaO2 99% room air,   Not in distress    Heart regular rate, normal s1 s2  Lungs clear  Abd soft, minimal tenderness consistent with post-op changes, uterus is firm. Incision dressing clean and dry.   No calf tenderness, SCD in situ  DTR 2+  Lochia mild    Preeclampsia labs reviewed.   Blood Rh neg, Antibody neg.

## 2017-04-05 NOTE — CONSULT NOTE ADULT - PROBLEM SELECTOR RECOMMENDATION 9
S/P c section on 4/04/17 @ 1832  -on magnesium and oxytocin gtt's  -given hydraalzine IVP and labetalol IVP over night  PO Procardia XL added this am given around 3am.  -BP is currently controlled at 146/88   -does not required an infusion of anti-hypertensives at this time  -if patient becomes hypertensive again with SBP >180MICU will re-evaluate for possible continuous infusion of anti-hypertensives  -would also recommend d/c'ing continuous IVF's as patient with hypertension

## 2017-04-06 LAB
ALBUMIN SERPL ELPH-MCNC: 1.6 G/DL — LOW (ref 3.3–5.2)
ALP SERPL-CCNC: 256 U/L — HIGH (ref 40–120)
ALT FLD-CCNC: 23 U/L — SIGNIFICANT CHANGE UP
ANION GAP SERPL CALC-SCNC: 10 MMOL/L — SIGNIFICANT CHANGE UP (ref 5–17)
AST SERPL-CCNC: 36 U/L — HIGH
BASOPHILS # BLD AUTO: 0 K/UL — SIGNIFICANT CHANGE UP (ref 0–0.2)
BASOPHILS NFR BLD AUTO: 0.1 % — SIGNIFICANT CHANGE UP (ref 0–2)
BILIRUB SERPL-MCNC: 0.1 MG/DL — LOW (ref 0.4–2)
BUN SERPL-MCNC: 16 MG/DL — SIGNIFICANT CHANGE UP (ref 8–20)
C TRACH RRNA SPEC QL NAA+PROBE: SIGNIFICANT CHANGE UP
CALCIUM SERPL-MCNC: 7.8 MG/DL — LOW (ref 8.6–10.2)
CHLORIDE SERPL-SCNC: 98 MMOL/L — SIGNIFICANT CHANGE UP (ref 98–107)
CO2 SERPL-SCNC: 26 MMOL/L — SIGNIFICANT CHANGE UP (ref 22–29)
CREAT SERPL-MCNC: 0.67 MG/DL — SIGNIFICANT CHANGE UP (ref 0.5–1.3)
EOSINOPHIL # BLD AUTO: 0 K/UL — SIGNIFICANT CHANGE UP (ref 0–0.5)
EOSINOPHIL NFR BLD AUTO: 0.1 % — SIGNIFICANT CHANGE UP (ref 0–6)
GLUCOSE SERPL-MCNC: 97 MG/DL — SIGNIFICANT CHANGE UP (ref 70–115)
HCT VFR BLD CALC: 37.9 % — SIGNIFICANT CHANGE UP (ref 37–47)
HGB BLD-MCNC: 12.8 G/DL — SIGNIFICANT CHANGE UP (ref 12–16)
HSV 1+2 IGM SER-IMP: 2.14 RATIO — HIGH (ref 0–0.9)
LYMPHOCYTES # BLD AUTO: 1.2 K/UL — SIGNIFICANT CHANGE UP (ref 1–4.8)
LYMPHOCYTES # BLD AUTO: 8.6 % — LOW (ref 20–55)
MAGNESIUM SERPL-MCNC: 2.8 MG/DL — HIGH (ref 1.8–2.5)
MAGNESIUM SERPL-MCNC: 3.7 MG/DL — HIGH (ref 1.8–2.5)
MAGNESIUM SERPL-MCNC: 5.4 MG/DL — HIGH (ref 1.8–2.5)
MCHC RBC-ENTMCNC: 28.5 PG — SIGNIFICANT CHANGE UP (ref 27–31)
MCHC RBC-ENTMCNC: 33.8 G/DL — SIGNIFICANT CHANGE UP (ref 32–36)
MCV RBC AUTO: 84.4 FL — SIGNIFICANT CHANGE UP (ref 81–99)
MONOCYTES # BLD AUTO: 0.8 K/UL — SIGNIFICANT CHANGE UP (ref 0–0.8)
MONOCYTES NFR BLD AUTO: 6.1 % — SIGNIFICANT CHANGE UP (ref 3–10)
N GONORRHOEA RRNA SPEC QL NAA+PROBE: SIGNIFICANT CHANGE UP
NEUTROPHILS # BLD AUTO: 11.5 K/UL — HIGH (ref 1.8–8)
NEUTROPHILS NFR BLD AUTO: 84.7 % — HIGH (ref 37–73)
PLATELET # BLD AUTO: 269 K/UL — SIGNIFICANT CHANGE UP (ref 150–400)
POTASSIUM SERPL-MCNC: 4.1 MMOL/L — SIGNIFICANT CHANGE UP (ref 3.5–5.3)
POTASSIUM SERPL-SCNC: 4.1 MMOL/L — SIGNIFICANT CHANGE UP (ref 3.5–5.3)
PROT SERPL-MCNC: 5.6 G/DL — LOW (ref 6.6–8.7)
RBC # BLD: 4.49 M/UL — SIGNIFICANT CHANGE UP (ref 4.4–5.2)
RBC # FLD: 14.4 % — SIGNIFICANT CHANGE UP (ref 11–15.6)
RUBV IGG SER-ACNC: POSITIVE — SIGNIFICANT CHANGE UP
RUBV IGG SER-IMP: SIGNIFICANT CHANGE UP
SODIUM SERPL-SCNC: 134 MMOL/L — LOW (ref 135–145)
SPECIMEN SOURCE: SIGNIFICANT CHANGE UP
WBC # BLD: 13.6 K/UL — HIGH (ref 4.8–10.8)
WBC # FLD AUTO: 13.6 K/UL — HIGH (ref 4.8–10.8)

## 2017-04-06 RX ORDER — SODIUM CHLORIDE 9 MG/ML
3 INJECTION INTRAMUSCULAR; INTRAVENOUS; SUBCUTANEOUS EVERY 8 HOURS
Qty: 0 | Refills: 0 | Status: DISCONTINUED | OUTPATIENT
Start: 2017-04-06 | End: 2017-04-11

## 2017-04-06 RX ADMIN — Medication 20 MILLIGRAM(S): at 06:10

## 2017-04-06 RX ADMIN — Medication 200 MILLIGRAM(S): at 17:36

## 2017-04-06 RX ADMIN — Medication 20 MILLIGRAM(S): at 17:36

## 2017-04-06 RX ADMIN — Medication 20 MILLIGRAM(S): at 23:24

## 2017-04-06 RX ADMIN — SODIUM CHLORIDE 100 MILLILITER(S): 9 INJECTION, SOLUTION INTRAVENOUS at 02:06

## 2017-04-06 RX ADMIN — Medication 20 MILLIGRAM(S): at 00:10

## 2017-04-06 RX ADMIN — Medication 20 MILLIGRAM(S): at 12:39

## 2017-04-06 RX ADMIN — Medication 200 MILLIGRAM(S): at 06:10

## 2017-04-06 RX ADMIN — Medication 100 MILLIGRAM(S): at 12:39

## 2017-04-06 RX ADMIN — Medication 325 MILLIGRAM(S): at 12:39

## 2017-04-06 RX ADMIN — Medication 1 TABLET(S): at 12:39

## 2017-04-06 RX ADMIN — Medication 25 GM/HR: at 00:10

## 2017-04-06 NOTE — DISCHARGE NOTE OB - PATIENT PORTAL LINK FT
“You can access the [employer] Patient Portal, offered by [facility], by registering with the following website: [email or web address]”

## 2017-04-06 NOTE — DISCHARGE NOTE OB - CARE PROVIDER_API CALL
[facility] [address],   [address], [place] [address]  Ph: [phone]  Phone: (   )    -  Fax: (   )    -

## 2017-04-06 NOTE — DISCHARGE NOTE OB - MEDICATION SUMMARY - MEDICATIONS TO TAKE
I will START or STAY ON the medications listed below when I get home from the hospital:    ibuprofen 600 mg oral tablet  -- 1 tab(s) by mouth every 6 hours, As Needed -for moderate pain  -- Do not take this drug if you are pregnant.  It is very important that you take or use this exactly as directed.  Do not skip doses or discontinue unless directed by your doctor.  May cause drowsiness or dizziness.  Obtain medical advice before taking any non-prescription drugs as some may affect the action of this medication.  Take with food or milk.    -- Indication: For Pain    labetalol 300 mg oral tablet  -- 2 tab(s) by mouth every 8 hours  -- It is very important that you take or use this exactly as directed.  Do not skip doses or discontinue unless directed by your doctor.  May cause drowsiness.  Alcohol may intensify this effect.  Use care when operating dangerous machinery.  Some non-prescription drugs may aggravate your condition.  Read all labels carefully.  If a warning appears, check with your doctor before taking.    -- Indication: For hypertension    Procardia XL 60 mg oral tablet, extended release  -- 1 tab(s) by mouth once a day  -- Avoid grapefruit and grapefruit juice while taking this medication.  It is very important that you take or use this exactly as directed.  Do not skip doses or discontinue unless directed by your doctor.  Some non-prescription drugs may aggravate your condition.  Read all labels carefully.  If a warning appears, check with your doctor before taking.  Swallow whole.  Do not crush.    -- Indication: For hypertension

## 2017-04-06 NOTE — DISCHARGE NOTE OB - CARE PLAN
Principal Discharge DX:	 delivery delivered  Goal:	reached  Instructions for follow-up, activity and diet:	Take meds as prescribed. Diet and activity as tolerated. F/u at [facility] in 6 weeks for post partum care.  Secondary Diagnosis:	Pre-eclampsia superimposed on chronic hypertension, delivered  Goal:	resolving  Instructions for follow-up, activity and diet:	Pt to be discharged home on antihypertensives*********** Pt to f/u for repeat blood pressure and wound care at [facility] in 2-3 days. Principal Discharge DX:	 delivery delivered  Goal:	reached  Instructions for follow-up, activity and diet:	Take meds as prescribed. Diet and activity as tolerated. F/u at [facility] in 6 weeks for post partum care.  Secondary Diagnosis:	Pre-eclampsia superimposed on chronic hypertension, delivered  Goal:	resolving  Instructions for follow-up, activity and diet:	Pt to be discharged home on antihypertensives*********** Pt to f/u for repeat blood pressure and wound care at [facility] in 2-3 days. Principal Discharge DX:	 delivery delivered  Goal:	reached  Instructions for follow-up, activity and diet:	Take meds as prescribed. Diet and activity as tolerated. F/u at [facility] in 6 weeks for post partum care.  Secondary Diagnosis:	Pre-eclampsia superimposed on chronic hypertension, delivered  Goal:	resolving  Instructions for follow-up, activity and diet:	Pt to be discharged home on antihypertensives nifedipine 60mg QD and labetalol 600mg Q8. Pt to f/u for repeat blood pressure and wound care at [facility] in 2-3 days. Principal Discharge DX:	 delivery delivered  Goal:	reached  Instructions for follow-up, activity and diet:	Take meds as prescribed. Diet and activity as tolerated. F/u at [facility] in 6 weeks for post partum care.  Secondary Diagnosis:	Pre-eclampsia superimposed on chronic hypertension, delivered  Goal:	resolving  Instructions for follow-up, activity and diet:	Pt to be discharged home on antihypertensives nifedipine 60mg QD and labetalol 600mg Q8. Pt to f/u for repeat blood pressure and wound care at [facility] in 2-3 days. Principal Discharge DX:	 delivery delivered  Goal:	reached  Instructions for follow-up, activity and diet:	Take meds as prescribed. Diet and activity as tolerated. F/u at [facility] in 6 weeks for post partum care.  Secondary Diagnosis:	Pre-eclampsia superimposed on chronic hypertension, delivered  Goal:	resolving  Instructions for follow-up, activity and diet:	Pt to be discharged home on antihypertensives nifedipine 60mg QD and labetalol 600mg Q8. Pt to f/u for repeat blood pressure and wound care at [facility] in 2 days. Principal Discharge DX:	 delivery delivered  Goal:	reached  Instructions for follow-up, activity and diet:	Take meds as prescribed. Diet and activity as tolerated. F/u at [facility] in 6 weeks for post partum care.  Secondary Diagnosis:	Pre-eclampsia superimposed on chronic hypertension, delivered  Goal:	resolving  Instructions for follow-up, activity and diet:	Pt to be discharged home on antihypertensives nifedipine 60mg QD and labetalol 600mg Q8. Pt to f/u for repeat blood pressure and wound care at [facility] in 2 days. Principal Discharge DX:	 delivery delivered  Goal:	reached  Instructions for follow-up, activity and diet:	Take meds as prescribed. Diet and activity as tolerated. F/u at [facility] in 6 weeks for post partum care.  Secondary Diagnosis:	Pre-eclampsia superimposed on chronic hypertension, delivered  Goal:	resolving  Instructions for follow-up, activity and diet:	Pt to be discharged home on antihypertensives nifedipine 60mg QD and labetalol 600mg Q8. Pt to f/u for repeat blood pressure and wound care at [facility] in 2 days.

## 2017-04-06 NOTE — DISCHARGE NOTE OB - PLAN OF CARE
reached resolving Take meds as prescribed. Diet and activity as tolerated. F/u at [facility] in 6 weeks for post partum care. Pt to be discharged home on antihypertensives*********** Pt to f/u for repeat blood pressure and wound care at [facility] in 2-3 days. Pt to be discharged home on antihypertensives nifedipine 60mg QD and labetalol 600mg Q8. Pt to f/u for repeat blood pressure and wound care at [facility] in 2-3 days. Pt to be discharged home on antihypertensives nifedipine 60mg QD and labetalol 600mg Q8. Pt to f/u for repeat blood pressure and wound care at [facility] in 2 days.

## 2017-04-06 NOTE — DISCHARGE NOTE OB - PROVIDER TOKENS
FREE:[LAST:[HR],PHONE:[(   )    -],FAX:[(   )    -],ADDRESS:[[facility], [place] [address]  Ph: [phone]]]

## 2017-04-06 NOTE — DISCHARGE NOTE OB - ADDITIONAL INSTRUCTIONS
Pt to be discharged home on antihypertensives nifedipine 60mg QD and labetalol 600mg Q8. Pt to f/u for repeat blood pressure and wound care at [facility] in 2 days. Pt to be discharged home on antihypertensives nifedipine 60mg QD and labetalol 600mg Q8. Pt to f/u for repeat blood pressure and wound care incision (to evaluate serosanguineous drainage) at [facility] in 2 days.

## 2017-04-06 NOTE — DISCHARGE NOTE OB - HOSPITAL COURSE
Pt is 26y year old  s/p emergency primary  at 32wks gestation for severe pre-eclampsia. Pt to have severely elevated blood pressure at time of presentation. Pt was given IV antihypertensives with multiple doses. FHT showed decelerations and along with uncontrolled blood pressure, prompted immediate . Delivery was uncomplicated. Mom noted to have about 70% abruption. Baby now in Nicu. Mom blood pressure remained high after delivery causing the need to add po antihypertensive meds. Pt was then started on 2 po antihypertensive meds. B.P now better controlled with. Mag was done and showed level of 2.8 Pt is medially optimized for discharge to f/u at [facility] clinic in 3 days. Pt is 26y year old  s/p emergency primary  at 32wks gestation for severe pre-eclampsia. Pt to have severely elevated blood pressure at time of presentation. Pt was given IV antihypertensives with multiple doses. FHT showed decelerations and along with uncontrolled blood pressure, prompted immediate . Delivery was uncomplicated. Mom noted to have about 70% abruption. Baby now in Nicu. Mom blood pressure remained high after delivery causing the need to add po antihypertensive meds. Pt was then started on 2 po antihypertensive meds. B.P now better controlled with. Mag level was done and showed level of 2.8. Pt is medically optimized for discharge to f/u at [facility] clinic in 3 days. Pt is 26y year old  s/p emergency primary  at 32wks gestation for severe pre-eclampsia. Pt to have severely elevated blood pressure at time of presentation. Pt was given IV antihypertensives with multiple doses. FHT showed decelerations and along with uncontrolled blood pressure, prompted immediate .  Mom noted to have about 70% abruption. Baby now in Nicu. Mom blood pressure remained high after delivery causing the need to add po antihypertensive meds. Pt was then started on 2 po antihypertensive meds, nifedipine and labetalol . Mag level was done and showed level of 2.0. MFM was consulted regarding the case as patient BP was still elevated.  Recommended increasing PO anti HTN meds. Pt is medically optimized for discharge to f/u at [facility] clinic in 2-3 days. Pt is 26y year old  s/p emergency primary  at 32wks gestation for severe pre-eclampsia. Pt to have severely elevated blood pressure at time of presentation. Pt was given IV antihypertensives with multiple doses. FHT showed decelerations and along with uncontrolled blood pressure, prompted immediate .  Mom noted to have about 70% abruption. Baby now in Nicu. Mom blood pressure remained high after delivery causing the need to add po antihypertensive meds. Pt was then started on 2 po antihypertensive meds, nifedipine and labetalol . Mag level was done and showed level of 2.0. MFM was consulted regarding the case as patient BP was still elevated.   Patient had serous sanguineous leakage at incision site on POD7  s/p csection, stable. Patient to follow up on leakage at [facility] in 48 hours. Recommended increasing PO anti HTN meds. Pt is medically optimized for discharge to f/u at [facility] clinic in 2-3 days.

## 2017-04-07 LAB
ALBUMIN SERPL ELPH-MCNC: 1.6 G/DL — LOW (ref 3.3–5.2)
ALP SERPL-CCNC: 203 U/L — HIGH (ref 40–120)
ALT FLD-CCNC: 18 U/L — SIGNIFICANT CHANGE UP
ANION GAP SERPL CALC-SCNC: 5 MMOL/L — SIGNIFICANT CHANGE UP (ref 5–17)
APTT BLD: 30.1 SEC — SIGNIFICANT CHANGE UP (ref 27.5–37.4)
AST SERPL-CCNC: 31 U/L — SIGNIFICANT CHANGE UP
BASOPHILS # BLD AUTO: 0 K/UL — SIGNIFICANT CHANGE UP (ref 0–0.2)
BASOPHILS NFR BLD AUTO: 0.2 % — SIGNIFICANT CHANGE UP (ref 0–2)
BILIRUB SERPL-MCNC: <0.1 MG/DL — LOW (ref 0.4–2)
BUN SERPL-MCNC: 14 MG/DL — SIGNIFICANT CHANGE UP (ref 8–20)
CALCIUM SERPL-MCNC: 8.2 MG/DL — LOW (ref 8.6–10.2)
CHLORIDE SERPL-SCNC: 102 MMOL/L — SIGNIFICANT CHANGE UP (ref 98–107)
CO2 SERPL-SCNC: 27 MMOL/L — SIGNIFICANT CHANGE UP (ref 22–29)
CREAT SERPL-MCNC: 0.61 MG/DL — SIGNIFICANT CHANGE UP (ref 0.5–1.3)
EOSINOPHIL # BLD AUTO: 0.1 K/UL — SIGNIFICANT CHANGE UP (ref 0–0.5)
EOSINOPHIL NFR BLD AUTO: 1 % — SIGNIFICANT CHANGE UP (ref 0–6)
FIBRINOGEN PPP-MCNC: 691 MG/DL — HIGH (ref 310–510)
GLUCOSE SERPL-MCNC: 75 MG/DL — SIGNIFICANT CHANGE UP (ref 70–115)
HCT VFR BLD CALC: 34.4 % — LOW (ref 37–47)
HGB BLD-MCNC: 11.9 G/DL — LOW (ref 12–16)
INR BLD: 0.87 RATIO — LOW (ref 0.88–1.16)
LDH SERPL L TO P-CCNC: 249 U/L — HIGH (ref 98–192)
LYMPHOCYTES # BLD AUTO: 1.8 K/UL — SIGNIFICANT CHANGE UP (ref 1–4.8)
LYMPHOCYTES # BLD AUTO: 14.9 % — LOW (ref 20–55)
MAGNESIUM SERPL-MCNC: 2 MG/DL — SIGNIFICANT CHANGE UP (ref 1.8–2.5)
MCHC RBC-ENTMCNC: 29 PG — SIGNIFICANT CHANGE UP (ref 27–31)
MCHC RBC-ENTMCNC: 34.6 G/DL — SIGNIFICANT CHANGE UP (ref 32–36)
MCV RBC AUTO: 83.7 FL — SIGNIFICANT CHANGE UP (ref 81–99)
MONOCYTES # BLD AUTO: 1.2 K/UL — HIGH (ref 0–0.8)
MONOCYTES NFR BLD AUTO: 10.2 % — HIGH (ref 3–10)
NEUTROPHILS # BLD AUTO: 8.7 K/UL — HIGH (ref 1.8–8)
NEUTROPHILS NFR BLD AUTO: 73.4 % — HIGH (ref 37–73)
PLATELET # BLD AUTO: 221 K/UL — SIGNIFICANT CHANGE UP (ref 150–400)
POTASSIUM SERPL-MCNC: 4 MMOL/L — SIGNIFICANT CHANGE UP (ref 3.5–5.3)
POTASSIUM SERPL-SCNC: 4 MMOL/L — SIGNIFICANT CHANGE UP (ref 3.5–5.3)
PROT SERPL-MCNC: 5.2 G/DL — LOW (ref 6.6–8.7)
PROTHROM AB SERPL-ACNC: 9.5 SEC — LOW (ref 9.8–12.7)
RBC # BLD: 4.11 M/UL — LOW (ref 4.4–5.2)
RBC # FLD: 14.4 % — SIGNIFICANT CHANGE UP (ref 11–15.6)
SODIUM SERPL-SCNC: 134 MMOL/L — LOW (ref 135–145)
URATE SERPL-MCNC: 5.2 MG/DL — SIGNIFICANT CHANGE UP (ref 2.4–5.7)
WBC # BLD: 11.8 K/UL — HIGH (ref 4.8–10.8)
WBC # FLD AUTO: 11.8 K/UL — HIGH (ref 4.8–10.8)

## 2017-04-07 PROCEDURE — 99234 HOSP IP/OBS SM DT SF/LOW 45: CPT

## 2017-04-07 RX ORDER — TETANUS TOXOID, REDUCED DIPHTHERIA TOXOID AND ACELLULAR PERTUSSIS VACCINE, ADSORBED 5; 2.5; 8; 8; 2.5 [IU]/.5ML; [IU]/.5ML; UG/.5ML; UG/.5ML; UG/.5ML
0.5 SUSPENSION INTRAMUSCULAR ONCE
Qty: 0 | Refills: 0 | Status: COMPLETED | OUTPATIENT
Start: 2017-04-07 | End: 2017-04-07

## 2017-04-07 RX ORDER — NIFEDIPINE 30 MG
30 TABLET, EXTENDED RELEASE 24 HR ORAL DAILY
Qty: 0 | Refills: 0 | Status: DISCONTINUED | OUTPATIENT
Start: 2017-04-07 | End: 2017-04-08

## 2017-04-07 RX ORDER — LABETALOL HCL 100 MG
400 TABLET ORAL EVERY 12 HOURS
Qty: 0 | Refills: 0 | Status: DISCONTINUED | OUTPATIENT
Start: 2017-04-07 | End: 2017-04-08

## 2017-04-07 RX ORDER — LABETALOL HCL 100 MG
200 TABLET ORAL ONCE
Qty: 0 | Refills: 0 | Status: COMPLETED | OUTPATIENT
Start: 2017-04-07 | End: 2017-04-07

## 2017-04-07 RX ORDER — HYDRALAZINE HCL 50 MG
5 TABLET ORAL ONCE
Qty: 0 | Refills: 0 | Status: COMPLETED | OUTPATIENT
Start: 2017-04-07 | End: 2017-04-07

## 2017-04-07 RX ADMIN — Medication 20 MILLIGRAM(S): at 05:19

## 2017-04-07 RX ADMIN — Medication 30 MILLIGRAM(S): at 12:04

## 2017-04-07 RX ADMIN — Medication 1 TABLET(S): at 12:04

## 2017-04-07 RX ADMIN — Medication 400 MILLIGRAM(S): at 17:31

## 2017-04-07 RX ADMIN — Medication 5 MILLIGRAM(S): at 14:10

## 2017-04-07 RX ADMIN — Medication 200 MILLIGRAM(S): at 05:19

## 2017-04-07 RX ADMIN — Medication 200 MILLIGRAM(S): at 13:54

## 2017-04-07 RX ADMIN — SODIUM CHLORIDE 3 MILLILITER(S): 9 INJECTION INTRAMUSCULAR; INTRAVENOUS; SUBCUTANEOUS at 14:20

## 2017-04-07 RX ADMIN — Medication 325 MILLIGRAM(S): at 12:04

## 2017-04-07 RX ADMIN — Medication 650 MILLIGRAM(S): at 14:19

## 2017-04-07 RX ADMIN — TETANUS TOXOID, REDUCED DIPHTHERIA TOXOID AND ACELLULAR PERTUSSIS VACCINE, ADSORBED 0.5 MILLILITER(S): 5; 2.5; 8; 8; 2.5 SUSPENSION INTRAMUSCULAR at 05:19

## 2017-04-07 NOTE — PROGRESS NOTE ADULT - SUBJECTIVE AND OBJECTIVE BOX
Pt is 26y year old  s/p primary  at 32wks gestation for severe pre-eclampsia. POD#3. Patient seen and examined at bedside, no acute overnight events. Patient is ambulating, +eating, +PO hydration, +voiding, +Flatus, -BM. Baby in NICU, mom is +Breast feeding though pumping. Pain is well controlled. Pt denies any headache, chest pain, visual changes. Pt denies headache, SOB, fever, chills and calf pain.    ROS- Negative except as above.    VS:   Vital Signs Last 24 Hrs  T(C): 36.3, Max: 37.2 (04-06 @ 21:05)  T(F): 97.3, Max: 99 (04-06 @ 21:05)  HR: 87 (72 - 97)  BP: 153/98 (100/62 - 155/96)  RR: 20 (16 - 20)  SpO2: 98% (97% - 100%)    Physical Exam:  General: NAD  CVS: RRR, +S1/S2  Lungs: CTAB, no wheeze, rhonchi or rales.   Abdomen: +BS, soft, ND, minimally tender, Fundus firm at level of umbilicus. Suture noted, clean incision site, healing well. Steri strips intact.  Pelvic: Minimal lochia  Ext: No cyanosis, +1 edema or calf tenderness.     Labs:                        12.8   13.6  )-----------( 269      ( 2017 10:22 )             37.9     Urinalysis Basic - ( 2017 10:45 )    Color: Yellow / Appearance: Clear / S.025 / pH: x  Gluc: x / Ketone: Negative  / Bili: Negative / Urobili: Negative mg/dL   Blood: x / Protein: 100 mg/dL / Nitrite: Negative   Leuk Esterase: Negative / RBC: 0-2 /HPF / WBC 0-2   Sq Epi: x / Non Sq Epi: x / Bacteria: x        Medication:  MEDICATIONS  (STANDING):  acetaminophen  IVPB. 1000milliGRAM(s) IV Intermittent once  oxytocin Infusion 333.333milliUNIT(s)/Min IV Continuous <Continuous>  oxytocin Infusion 41.667milliUNIT(s)/Min IV Continuous <Continuous>  oxytocin Infusion 41.667milliUNIT(s)/Min IV Continuous <Continuous>  ferrous    sulfate 325milliGRAM(s) Oral daily  prenatal multivitamin 1Tablet(s) Oral daily  oxytocin Infusion 333.333milliUNIT(s)/Min IV Continuous <Continuous>  NIFEdipine IR 20milliGRAM(s) Oral every 6 hours  labetalol 200milliGRAM(s) Oral every 12 hours  sodium chloride 0.9% lock flush 3milliLiter(s) IV Push every 8 hours    MEDICATIONS  (PRN):  naloxone Injectable 0.1milliGRAM(s) IV Push every 3 minutes PRN For ANY of the following changes in patient status:  A. RR LESS THAN 10 breaths per minute, B. Oxygen saturation LESS THAN 90%, C. Sedation score of 6  ondansetron Injectable 4milliGRAM(s) IV Push every 6 hours PRN Nausea  oxyCODONE IR 5milliGRAM(s) Oral every 3 hours PRN Mild Pain  oxyCODONE IR 10milliGRAM(s) Oral every 3 hours PRN Moderate Pain  HYDROmorphone  Injectable 1milliGRAM(s) IV Push every 3 hours PRN Severe Pain  naloxone Injectable 0.1milliGRAM(s) IV Push every 3 minutes PRN For ANY of the following changes in patient status:  A. RR LESS THAN 10 breaths per minute, B. Oxygen saturation LESS THAN 90%, C. Sedation score of 6  ondansetron Injectable 4milliGRAM(s) IV Push every 6 hours PRN Nausea  diphenhydrAMINE   Injectable 25milliGRAM(s) IV Push every 4 hours PRN Pruritus  ondansetron Injectable 4milliGRAM(s) IV Push every 6 hours PRN Postoperative Nausea and  Vomiting  acetaminophen   Tablet 650milliGRAM(s) Oral every 6 hours PRN For Temp greater than 38.5 C (101.3 F)  ibuprofen  Tablet 600milliGRAM(s) Oral every 6 hours PRN Mild pain or headache  oxyCODONE  5 mG/acetaminophen 325 mG 1Tablet(s) Oral every 3 hours PRN Moderate Pain  oxyCODONE  5 mG/acetaminophen 325 mG 2Tablet(s) Oral every 6 hours PRN Severe Pain  simethicone 80milliGRAM(s) Chew every 4 hours PRN Gas  diphenhydrAMINE   Capsule 25milliGRAM(s) Oral every 6 hours PRN Itching  glycerin Suppository - Adult 1Suppository(s) Rectal at bedtime PRN Constipation  docusate sodium 100milliGRAM(s) Oral two times a day PRN Stool Softening  lanolin Ointment 1Application(s) Topical every 3 hours PRN Sore Nipples  oxyCODONE  5 mG/acetaminophen 325 mG 2Tablet(s) Oral every 6 hours PRN Severe Pain (7 - 10)

## 2017-04-07 NOTE — PROGRESS NOTE ADULT - ASSESSMENT
POD# 2   s/p  for severe pre-eclampsia at 32 wks,   Continue procardia for BP control.   May discharge home tomorrow.  Follow up in clinic for BP check and wound check.

## 2017-04-07 NOTE — PROGRESS NOTE ADULT - ASSESSMENT
Pt is 26y year old  s/p emergency primary  at 32wks gestation for severe pre-eclampsia. Pt on antihypertensives. Magnesium was discontinued. Mag level at 2.8. Will trend level.

## 2017-04-07 NOTE — CHART NOTE - NSCHARTNOTEFT_GEN_A_CORE
Pt with elevated blood pressure since AM. meds adjusted. Pt was on labetalol 200mg BID and Procardia !R 20mg. Pt meds adjusted to Labetalol 400mg BID and Procardia XL 30mg QD. Will recheck blood pressure and adjust meds as needed. Last B.P was 163/99. Will reorder preeclampsia labs and f/u results.

## 2017-04-07 NOTE — PROGRESS NOTE ADULT - SUBJECTIVE AND OBJECTIVE BOX
pod#2  s/p primary low transverse c section due to severe preeclampsia ( severe BP, with placenta abruption).  Patient offers no complains this am.   She rests in bed, comfortably, pain is well controlled with po medications.   She urinated spontaneously, tolerate po diet, has flatus.  She denies headache, visual changes or epigastric pain.     VSS, '/80', occasional spikes at night noted.   Heart regular rate, normal s1 s2  Lungs clear  Abd soft, minimal tenderness consistent with post-op changes, uterus is firm. Incision intact, clean and dry. subcuticular fashion.   No calf tenderness, SCD in situ  DTR 2+  Lochia mild    Preeclampsia labs reviewed.   Blood Rh neg, Antibody neg. Infant Rh neg.

## 2017-04-07 NOTE — PROGRESS NOTE ADULT - ATTENDING COMMENTS
Hemodynamically stable  BP's from normo to mild range  s/p Mag yesterday  On labetalol 200mg BID and Nifedipine 20mg q 6hrs-will keep at current dose  continue current mgmt

## 2017-04-08 LAB
APPEARANCE UR: CLEAR — SIGNIFICANT CHANGE UP
BILIRUB UR-MCNC: NEGATIVE — SIGNIFICANT CHANGE UP
COLOR SPEC: YELLOW — SIGNIFICANT CHANGE UP
DIFF PNL FLD: ABNORMAL
EPI CELLS # UR: SIGNIFICANT CHANGE UP
GLUCOSE UR QL: NEGATIVE MG/DL — SIGNIFICANT CHANGE UP
KETONES UR-MCNC: NEGATIVE — SIGNIFICANT CHANGE UP
LEUKOCYTE ESTERASE UR-ACNC: ABNORMAL
NITRITE UR-MCNC: NEGATIVE — SIGNIFICANT CHANGE UP
PH UR: 8 — SIGNIFICANT CHANGE UP (ref 4.8–8)
PROT UR-MCNC: 500 MG/DL
RBC CASTS # UR COMP ASSIST: SIGNIFICANT CHANGE UP /HPF (ref 0–4)
SP GR SPEC: 1.01 — SIGNIFICANT CHANGE UP (ref 1.01–1.02)
UROBILINOGEN FLD QL: NEGATIVE MG/DL — SIGNIFICANT CHANGE UP
WBC UR QL: SIGNIFICANT CHANGE UP

## 2017-04-08 RX ORDER — NIFEDIPINE 30 MG
60 TABLET, EXTENDED RELEASE 24 HR ORAL DAILY
Qty: 0 | Refills: 0 | Status: DISCONTINUED | OUTPATIENT
Start: 2017-04-08 | End: 2017-04-11

## 2017-04-08 RX ORDER — LABETALOL HCL 100 MG
400 TABLET ORAL EVERY 8 HOURS
Qty: 0 | Refills: 0 | Status: DISCONTINUED | OUTPATIENT
Start: 2017-04-08 | End: 2017-04-10

## 2017-04-08 RX ADMIN — Medication 60 MILLIGRAM(S): at 06:20

## 2017-04-08 RX ADMIN — Medication 400 MILLIGRAM(S): at 13:23

## 2017-04-08 RX ADMIN — Medication 600 MILLIGRAM(S): at 20:11

## 2017-04-08 RX ADMIN — SIMETHICONE 80 MILLIGRAM(S): 80 TABLET, CHEWABLE ORAL at 10:07

## 2017-04-08 RX ADMIN — Medication 400 MILLIGRAM(S): at 21:48

## 2017-04-08 RX ADMIN — Medication 325 MILLIGRAM(S): at 13:22

## 2017-04-08 RX ADMIN — Medication 100 MILLIGRAM(S): at 10:07

## 2017-04-08 RX ADMIN — Medication 1 TABLET(S): at 13:23

## 2017-04-08 RX ADMIN — Medication 400 MILLIGRAM(S): at 05:23

## 2017-04-08 RX ADMIN — Medication 600 MILLIGRAM(S): at 21:30

## 2017-04-08 RX ADMIN — SODIUM CHLORIDE 3 MILLILITER(S): 9 INJECTION INTRAMUSCULAR; INTRAVENOUS; SUBCUTANEOUS at 13:23

## 2017-04-08 NOTE — PROGRESS NOTE ADULT - SUBJECTIVE AND OBJECTIVE BOX
Postpartum Note,  Section  She is a  26y woman who is now post-operative day # 4     Subjective:  Patient feeling well, ambulating, mild incisional discomfort   Denies any nausea and vomiting, H/A, CP, RUQ pain, CT, blurry vision      Vital Signs Last 24 Hrs  T(C): 36.4, Max: 36.5 ( @ 08:10)  T(F): 97.5, Max: 97.7 ( @ 08:10)  HR: 72 (72 - 86)  BP: 154/96 (120/82 - 177/110)  BP(mean): --  RR: 20 (15 - 20)  SpO2: 98% (98% - 98%)    Physical:  Lungs: Normal  Heart: Regular rate and rhythm  Abdomen: Soft, appropriately tender, no distension , + BS, firm uterine fundus, the incision is clean dry and intact, sutures in place  Pelvic: Normal lochia noted  Ext: No DVT signs, warm extremities, normal pulses, 2+ DTR, no clonus    LABS:                        11.9   11.8  )-----------( 221      ( 2017 13:44 )             34.4         134<L>  |  102  |  14.0  ----------------------------<  75  4.0   |  27.0  |  0.61    Ca    8.2<L>      2017 13:44  Mg     2.0         TPro  5.2<L>  /  Alb  1.6<L>  /  TBili  <0.1<L>  /  DBili  x   /  AST  31  /  ALT  18  /  AlkPhos  203<H>      PT/INR - ( 2017 13:44 )   PT: 9.5 sec;   INR: 0.87 ratio         PTT - ( 2017 13:44 )  PTT:30.1 sec      Allergies    No Known Allergies    Intolerances      MEDICATIONS  (STANDING):  acetaminophen  IVPB. 1000milliGRAM(s) IV Intermittent once  oxytocin Infusion 333.333milliUNIT(s)/Min IV Continuous <Continuous>  oxytocin Infusion 41.667milliUNIT(s)/Min IV Continuous <Continuous>  oxytocin Infusion 41.667milliUNIT(s)/Min IV Continuous <Continuous>  ferrous    sulfate 325milliGRAM(s) Oral daily  prenatal multivitamin 1Tablet(s) Oral daily  oxytocin Infusion 333.333milliUNIT(s)/Min IV Continuous <Continuous>  sodium chloride 0.9% lock flush 3milliLiter(s) IV Push every 8 hours  labetalol 400milliGRAM(s) Oral every 12 hours  NIFEdipine XL 60milliGRAM(s) Oral daily    MEDICATIONS  (PRN):  naloxone Injectable 0.1milliGRAM(s) IV Push every 3 minutes PRN For ANY of the following changes in patient status:  A. RR LESS THAN 10 breaths per minute, B. Oxygen saturation LESS THAN 90%, C. Sedation score of 6  ondansetron Injectable 4milliGRAM(s) IV Push every 6 hours PRN Nausea  oxyCODONE IR 5milliGRAM(s) Oral every 3 hours PRN Mild Pain  oxyCODONE IR 10milliGRAM(s) Oral every 3 hours PRN Moderate Pain  HYDROmorphone  Injectable 1milliGRAM(s) IV Push every 3 hours PRN Severe Pain  naloxone Injectable 0.1milliGRAM(s) IV Push every 3 minutes PRN For ANY of the following changes in patient status:  A. RR LESS THAN 10 breaths per minute, B. Oxygen saturation LESS THAN 90%, C. Sedation score of 6  ondansetron Injectable 4milliGRAM(s) IV Push every 6 hours PRN Nausea  diphenhydrAMINE   Injectable 25milliGRAM(s) IV Push every 4 hours PRN Pruritus  ondansetron Injectable 4milliGRAM(s) IV Push every 6 hours PRN Postoperative Nausea and  Vomiting  acetaminophen   Tablet 650milliGRAM(s) Oral every 6 hours PRN For Temp greater than 38.5 C (101.3 F)  ibuprofen  Tablet 600milliGRAM(s) Oral every 6 hours PRN Mild pain or headache  oxyCODONE  5 mG/acetaminophen 325 mG 1Tablet(s) Oral every 3 hours PRN Moderate Pain  oxyCODONE  5 mG/acetaminophen 325 mG 2Tablet(s) Oral every 6 hours PRN Severe Pain  simethicone 80milliGRAM(s) Chew every 4 hours PRN Gas  diphenhydrAMINE   Capsule 25milliGRAM(s) Oral every 6 hours PRN Itching  glycerin Suppository - Adult 1Suppository(s) Rectal at bedtime PRN Constipation  docusate sodium 100milliGRAM(s) Oral two times a day PRN Stool Softening  lanolin Ointment 1Application(s) Topical every 3 hours PRN Sore Nipples  oxyCODONE  5 mG/acetaminophen 325 mG 2Tablet(s) Oral every 6 hours PRN Severe Pain (7 - 10)      RADIOLOGY & ADDITIONAL TESTS:    Assessment and Plan   Section on POD # 4 S/P C/S for pre-eclampsia. Episode of severe BP with repeat in mild range, asymptomatic on labetalol 400mg BID and Nifedipine 30mg XL  Administer labetalol and increase nifedipine to 60mg  Continue close observation and BP monitoring  Continue the current pain medication  Encourage ISS & Ambulation  Encourage regular diet   DVT ppx: SCDs  Anticipate D/C home after BP are consistently under control

## 2017-04-09 RX ADMIN — SODIUM CHLORIDE 3 MILLILITER(S): 9 INJECTION INTRAMUSCULAR; INTRAVENOUS; SUBCUTANEOUS at 21:46

## 2017-04-09 RX ADMIN — Medication 400 MILLIGRAM(S): at 05:41

## 2017-04-09 RX ADMIN — Medication 1 TABLET(S): at 13:23

## 2017-04-09 RX ADMIN — Medication 60 MILLIGRAM(S): at 05:41

## 2017-04-09 RX ADMIN — Medication 400 MILLIGRAM(S): at 21:45

## 2017-04-09 RX ADMIN — SODIUM CHLORIDE 3 MILLILITER(S): 9 INJECTION INTRAMUSCULAR; INTRAVENOUS; SUBCUTANEOUS at 15:19

## 2017-04-09 RX ADMIN — Medication 400 MILLIGRAM(S): at 15:20

## 2017-04-09 RX ADMIN — Medication 325 MILLIGRAM(S): at 13:24

## 2017-04-09 NOTE — PROGRESS NOTE ADULT - ATTENDING COMMENTS
Post op day # 5  s/p primary  for cat 2 tracing/preeclampsia  discharge held for BP optimization  On nefidipine 60 XL and labetalol   Bp 130-150/80-90 yesterday  Bp 110s/80s this am  May consider discharge this BP if BP controlled  Follow up in [facility] clinic in 3-4 days  HOme BP monitoring and VNS services to be arranged

## 2017-04-09 NOTE — PROGRESS NOTE ADULT - SUBJECTIVE AND OBJECTIVE BOX
Pt is 26y year old  s/p primary  at 32wks gestation for severe pre-eclampsia. POD#5. Patient seen and examined at bedside, no acute overnight events. Patient is ambulating, +eating, +PO hydration, +voiding, +Flatus, -BM. Baby in NICU, mom is +Breast feeding though pumping. Pain is well controlled. Pt denies any headache, chest pain, visual changes. Pt denies headache, SOB, fever, chills and calf pain.    ROS- Negative except as above.    VS:   ICU Vital Signs Last 24 Hrs  T(C): 36.7, Max: 36.7 (- @ 20:15)  T(F): 98, Max: 98.1 ( @ 20:15)  HR: 76 (74 - 83)  BP: 129/80 (118/78 - 161/100)  BP(mean): --  ABP: --  ABP(mean): --  RR: 18 (18 - 20)  SpO2: 100% (98% - 100%)      Physical Exam:  General: NAD  CVS: RRR, +S1/S2  Lungs: CTAB, no wheeze, rhonchi or rales.   Abdomen: +BS, soft, ND, minimally tender, Fundus firm at level of umbilicus. Suture noted, clean incision site, healing well. Steri strips intact.  Pelvic: Minimal lochia  Ext: No cyanosis, +1 edema or calf tenderness.     Labs:                                          11.9   11.8  )-----------( 221      ( 2017 13:44 )             34.4       134<L>  |  102  |  14.0  ----------------------------<  75  4.0   |  27.0  |  0.61    Ca    8.2<L>      2017 13:44  Mg     2.0         TPro  5.2<L>  /  Alb  1.6<L>  /  TBili  <0.1<L>  /  DBili  x   /  AST  31  /  ALT  18  /  AlkPhos  203<H>        Urinalysis Basic - ( 2017 10:45 )    Color: Yellow / Appearance: Clear / S.025 / pH: x  Gluc: x / Ketone: Negative  / Bili: Negative / Urobili: Negative mg/dL   Blood: x / Protein: 100 mg/dL / Nitrite: Negative   Leuk Esterase: Negative / RBC: 0-2 /HPF / WBC 0-2   Sq Epi: x / Non Sq Epi: x / Bacteria: x        Medication:  MEDICATIONS  (STANDING):  acetaminophen  IVPB. 1000milliGRAM(s) IV Intermittent once  oxytocin Infusion 333.333milliUNIT(s)/Min IV Continuous <Continuous>  oxytocin Infusion 41.667milliUNIT(s)/Min IV Continuous <Continuous>  oxytocin Infusion 41.667milliUNIT(s)/Min IV Continuous <Continuous>  ferrous    sulfate 325milliGRAM(s) Oral daily  prenatal multivitamin 1Tablet(s) Oral daily  oxytocin Infusion 333.333milliUNIT(s)/Min IV Continuous <Continuous>  NIFEdipine IR 20milliGRAM(s) Oral every 6 hours  labetalol 200milliGRAM(s) Oral every 12 hours  sodium chloride 0.9% lock flush 3milliLiter(s) IV Push every 8 hours    MEDICATIONS  (PRN):  naloxone Injectable 0.1milliGRAM(s) IV Push every 3 minutes PRN For ANY of the following changes in patient status:  A. RR LESS THAN 10 breaths per minute, B. Oxygen saturation LESS THAN 90%, C. Sedation score of 6  ondansetron Injectable 4milliGRAM(s) IV Push every 6 hours PRN Nausea  oxyCODONE IR 5milliGRAM(s) Oral every 3 hours PRN Mild Pain  oxyCODONE IR 10milliGRAM(s) Oral every 3 hours PRN Moderate Pain  HYDROmorphone  Injectable 1milliGRAM(s) IV Push every 3 hours PRN Severe Pain  naloxone Injectable 0.1milliGRAM(s) IV Push every 3 minutes PRN For ANY of the following changes in patient status:  A. RR LESS THAN 10 breaths per minute, B. Oxygen saturation LESS THAN 90%, C. Sedation score of 6  ondansetron Injectable 4milliGRAM(s) IV Push every 6 hours PRN Nausea  diphenhydrAMINE   Injectable 25milliGRAM(s) IV Push every 4 hours PRN Pruritus  ondansetron Injectable 4milliGRAM(s) IV Push every 6 hours PRN Postoperative Nausea and  Vomiting  acetaminophen   Tablet 650milliGRAM(s) Oral every 6 hours PRN For Temp greater than 38.5 C (101.3 F)  ibuprofen  Tablet 600milliGRAM(s) Oral every 6 hours PRN Mild pain or headache  oxyCODONE  5 mG/acetaminophen 325 mG 1Tablet(s) Oral every 3 hours PRN Moderate Pain  oxyCODONE  5 mG/acetaminophen 325 mG 2Tablet(s) Oral every 6 hours PRN Severe Pain  simethicone 80milliGRAM(s) Chew every 4 hours PRN Gas  diphenhydrAMINE   Capsule 25milliGRAM(s) Oral every 6 hours PRN Itching  glycerin Suppository - Adult 1Suppository(s) Rectal at bedtime PRN Constipation  docusate sodium 100milliGRAM(s) Oral two times a day PRN Stool Softening  lanolin Ointment 1Application(s) Topical every 3 hours PRN Sore Nipples  oxyCODONE  5 mG/acetaminophen 325 mG 2Tablet(s) Oral every 6 hours PRN Severe Pain (7 - 10)

## 2017-04-10 DIAGNOSIS — R30.0 DYSURIA: ICD-10-CM

## 2017-04-10 RX ORDER — HYDRALAZINE HCL 50 MG
5 TABLET ORAL ONCE
Qty: 0 | Refills: 0 | Status: COMPLETED | OUTPATIENT
Start: 2017-04-10 | End: 2017-04-10

## 2017-04-10 RX ORDER — LABETALOL HCL 100 MG
600 TABLET ORAL THREE TIMES A DAY
Qty: 0 | Refills: 0 | Status: DISCONTINUED | OUTPATIENT
Start: 2017-04-10 | End: 2017-04-11

## 2017-04-10 RX ADMIN — Medication 600 MILLIGRAM(S): at 21:34

## 2017-04-10 RX ADMIN — Medication 1 TABLET(S): at 13:08

## 2017-04-10 RX ADMIN — Medication 25 MILLIGRAM(S): at 21:37

## 2017-04-10 RX ADMIN — Medication 600 MILLIGRAM(S): at 13:08

## 2017-04-10 RX ADMIN — Medication 60 MILLIGRAM(S): at 05:45

## 2017-04-10 RX ADMIN — SODIUM CHLORIDE 3 MILLILITER(S): 9 INJECTION INTRAMUSCULAR; INTRAVENOUS; SUBCUTANEOUS at 21:38

## 2017-04-10 RX ADMIN — SODIUM CHLORIDE 3 MILLILITER(S): 9 INJECTION INTRAMUSCULAR; INTRAVENOUS; SUBCUTANEOUS at 14:58

## 2017-04-10 RX ADMIN — SODIUM CHLORIDE 3 MILLILITER(S): 9 INJECTION INTRAMUSCULAR; INTRAVENOUS; SUBCUTANEOUS at 05:47

## 2017-04-10 RX ADMIN — Medication 325 MILLIGRAM(S): at 13:08

## 2017-04-10 RX ADMIN — Medication 400 MILLIGRAM(S): at 05:37

## 2017-04-10 NOTE — PROGRESS NOTE ADULT - PROBLEM SELECTOR PLAN 3
Routine post-op care  Routine postpartum care.  DVT prophylaxis
F/U urinalaysis
Routine post-op care  Routine postpartum care.  DVT prophylaxis

## 2017-04-10 NOTE — PROGRESS NOTE ADULT - ATTENDING COMMENTS
patient with elevated blood pressures this am x 2 >160/110  will give hydrlayzien ivp  patient currently on labetalol 400mg tid and procardia 60mg xl  will repeat preeclamptic labs this am  steri strips removed from incision  no skin separation and skin infection noted  will reconsult [facility] for BP control

## 2017-04-10 NOTE — PROGRESS NOTE ADULT - ASSESSMENT
Pt is 26y year old  s/p emergency primary  at 32wks gestation for severe pre-eclampsia. Pt on antihypertensives.  Patient BP is still elevated. No symptom reported.

## 2017-04-10 NOTE — PROGRESS NOTE ADULT - SUBJECTIVE AND OBJECTIVE BOX
PGY1 Note     Pt is 26y year old  s/p primary  at 32wks gestation for severe pre-eclampsia. POD#6. Patient seen and examined at bedside.  Patient states that her abdomen still hurts but the pain is the same as previous, not increased.  She states that there is some whitish clear fluids from the wounds yesterday and some pinkish fluid today. Patient denies as headaches, dizziness, vision changes, but does report dysuria.  Patient is ambulating, +eating, +PO hydration, +voiding, +Flatus, +BM. Baby in NICU, mom is +Breast feeding though pumping.     ROS- Negative except as above.    Vital Signs Last 24 Hrs  T(C): 36.4, Max: 36.7 (04-09 @ 07:12)  T(F): 97.6, Max: 98.1 (04-09 @ 07:12)  HR: 67 (67 - 77)  BP: 164/107 (105/67 - 164/107)  BP(mean): --  RR: 18 (18 - 18)  SpO2: 100% (100% - 100%)    Physical Exam:  General: NAD  CVS: RRR, +S1/S2  Lungs: CTAB, no wheeze, rhonchi or rales.   Abdomen: +BS, soft, ND, appropriately tender, Fundus firm at level of umbilicus. Suture noted, minimal fluids from incision site. no signs of infection, healing well. Steri strips intact.  Pelvic: Minimal lochia  Ext: No cyanosis, +1 edema no calf tenderness.   Neuro: AAOx3                Urinalysis Basic - ( 2017 14:56 )    Color: Yellow / Appearance: Clear / S.010 / pH: x  Gluc: x / Ketone: Negative  / Bili: Negative / Urobili: Negative mg/dL   Blood: x / Protein: 500 mg/dL / Nitrite: Negative   Leuk Esterase: Trace / RBC: 0-2 /HPF / WBC 3-5   Sq Epi: x / Non Sq Epi: Occasional / Bacteria: x    MEDICATIONS  (STANDING):  acetaminophen  IVPB. 1000milliGRAM(s) IV Intermittent once  oxytocin Infusion 333.333milliUNIT(s)/Min IV Continuous <Continuous>  oxytocin Infusion 41.667milliUNIT(s)/Min IV Continuous <Continuous>  oxytocin Infusion 41.667milliUNIT(s)/Min IV Continuous <Continuous>  ferrous    sulfate 325milliGRAM(s) Oral daily  prenatal multivitamin 1Tablet(s) Oral daily  oxytocin Infusion 333.333milliUNIT(s)/Min IV Continuous <Continuous>  sodium chloride 0.9% lock flush 3milliLiter(s) IV Push every 8 hours  NIFEdipine XL 60milliGRAM(s) Oral daily  labetalol 400milliGRAM(s) Oral every 8 hours    MEDICATIONS  (PRN):  naloxone Injectable 0.1milliGRAM(s) IV Push every 3 minutes PRN For ANY of the following changes in patient status:  A. RR LESS THAN 10 breaths per minute, B. Oxygen saturation LESS THAN 90%, C. Sedation score of 6  ondansetron Injectable 4milliGRAM(s) IV Push every 6 hours PRN Nausea  oxyCODONE IR 5milliGRAM(s) Oral every 3 hours PRN Mild Pain  oxyCODONE IR 10milliGRAM(s) Oral every 3 hours PRN Moderate Pain  HYDROmorphone  Injectable 1milliGRAM(s) IV Push every 3 hours PRN Severe Pain  naloxone Injectable 0.1milliGRAM(s) IV Push every 3 minutes PRN For ANY of the following changes in patient status:  A. RR LESS THAN 10 breaths per minute, B. Oxygen saturation LESS THAN 90%, C. Sedation score of 6  ondansetron Injectable 4milliGRAM(s) IV Push every 6 hours PRN Nausea  diphenhydrAMINE   Injectable 25milliGRAM(s) IV Push every 4 hours PRN Pruritus  ondansetron Injectable 4milliGRAM(s) IV Push every 6 hours PRN Postoperative Nausea and  Vomiting  acetaminophen   Tablet 650milliGRAM(s) Oral every 6 hours PRN For Temp greater than 38.5 C (101.3 F)  ibuprofen  Tablet 600milliGRAM(s) Oral every 6 hours PRN Mild pain or headache  oxyCODONE  5 mG/acetaminophen 325 mG 1Tablet(s) Oral every 3 hours PRN Moderate Pain  oxyCODONE  5 mG/acetaminophen 325 mG 2Tablet(s) Oral every 6 hours PRN Severe Pain  simethicone 80milliGRAM(s) Chew every 4 hours PRN Gas  diphenhydrAMINE   Capsule 25milliGRAM(s) Oral every 6 hours PRN Itching  glycerin Suppository - Adult 1Suppository(s) Rectal at bedtime PRN Constipation  docusate sodium 100milliGRAM(s) Oral two times a day PRN Stool Softening  lanolin Ointment 1Application(s) Topical every 3 hours PRN Sore Nipples  oxyCODONE  5 mG/acetaminophen 325 mG 2Tablet(s) Oral every 6 hours PRN Severe Pain (7 - 10) PGY1 Note     Pt is 26y year old  s/p primary  at 32wks gestation for severe pre-eclampsia. POD#6. Patient seen and examined at bedside.  Patient states that her abdomen still hurts but the pain is the same as previous, not increased.  She states that there is some whitish clear fluids from the wounds yesterday and some pinkish fluid today. Patient denies as headaches, dizziness, vision changes, but does report dysuria.  Patient is ambulating, +eating, +PO hydration, +voiding, +Flatus, +BM. Baby in NICU, mom is +Breast feeding though pumping.     ROS- Negative except as above.    Vital Signs Last 24 Hrs  T(C): 36.4, Max: 36.7 (04-09 @ 07:12)  T(F): 97.6, Max: 98.1 (04-09 @ 07:12)  HR: 67 (67 - 77)  BP: 164/107 (105/67 - 164/107)  BP(mean): --  RR: 18 (18 - 18)  SpO2: 100% (100% - 100%)    Physical Exam:  General: NAD  CVS: RRR, +S1/S2  Lungs: CTAB, no wheeze, rhonchi or rales.   Abdomen: +BS, soft, ND, appropriately tender, Fundus firm at level of umbilicus. Suture noted, minimal fluids from incision site. no signs of infection, healing well  Pelvic: Minimal lochia  Ext: No cyanosis, +1 edema no calf tenderness.   Neuro: AAOx3                Urinalysis Basic - ( 2017 14:56 )    Color: Yellow / Appearance: Clear / S.010 / pH: x  Gluc: x / Ketone: Negative  / Bili: Negative / Urobili: Negative mg/dL   Blood: x / Protein: 500 mg/dL / Nitrite: Negative   Leuk Esterase: Trace / RBC: 0-2 /HPF / WBC 3-5   Sq Epi: x / Non Sq Epi: Occasional / Bacteria: x    MEDICATIONS  (STANDING):  acetaminophen  IVPB. 1000milliGRAM(s) IV Intermittent once  oxytocin Infusion 333.333milliUNIT(s)/Min IV Continuous <Continuous>  oxytocin Infusion 41.667milliUNIT(s)/Min IV Continuous <Continuous>  oxytocin Infusion 41.667milliUNIT(s)/Min IV Continuous <Continuous>  ferrous    sulfate 325milliGRAM(s) Oral daily  prenatal multivitamin 1Tablet(s) Oral daily  oxytocin Infusion 333.333milliUNIT(s)/Min IV Continuous <Continuous>  sodium chloride 0.9% lock flush 3milliLiter(s) IV Push every 8 hours  NIFEdipine XL 60milliGRAM(s) Oral daily  labetalol 400milliGRAM(s) Oral every 8 hours    MEDICATIONS  (PRN):  naloxone Injectable 0.1milliGRAM(s) IV Push every 3 minutes PRN For ANY of the following changes in patient status:  A. RR LESS THAN 10 breaths per minute, B. Oxygen saturation LESS THAN 90%, C. Sedation score of 6  ondansetron Injectable 4milliGRAM(s) IV Push every 6 hours PRN Nausea  oxyCODONE IR 5milliGRAM(s) Oral every 3 hours PRN Mild Pain  oxyCODONE IR 10milliGRAM(s) Oral every 3 hours PRN Moderate Pain  HYDROmorphone  Injectable 1milliGRAM(s) IV Push every 3 hours PRN Severe Pain  naloxone Injectable 0.1milliGRAM(s) IV Push every 3 minutes PRN For ANY of the following changes in patient status:  A. RR LESS THAN 10 breaths per minute, B. Oxygen saturation LESS THAN 90%, C. Sedation score of 6  ondansetron Injectable 4milliGRAM(s) IV Push every 6 hours PRN Nausea  diphenhydrAMINE   Injectable 25milliGRAM(s) IV Push every 4 hours PRN Pruritus  ondansetron Injectable 4milliGRAM(s) IV Push every 6 hours PRN Postoperative Nausea and  Vomiting  acetaminophen   Tablet 650milliGRAM(s) Oral every 6 hours PRN For Temp greater than 38.5 C (101.3 F)  ibuprofen  Tablet 600milliGRAM(s) Oral every 6 hours PRN Mild pain or headache  oxyCODONE  5 mG/acetaminophen 325 mG 1Tablet(s) Oral every 3 hours PRN Moderate Pain  oxyCODONE  5 mG/acetaminophen 325 mG 2Tablet(s) Oral every 6 hours PRN Severe Pain  simethicone 80milliGRAM(s) Chew every 4 hours PRN Gas  diphenhydrAMINE   Capsule 25milliGRAM(s) Oral every 6 hours PRN Itching  glycerin Suppository - Adult 1Suppository(s) Rectal at bedtime PRN Constipation  docusate sodium 100milliGRAM(s) Oral two times a day PRN Stool Softening  lanolin Ointment 1Application(s) Topical every 3 hours PRN Sore Nipples  oxyCODONE  5 mG/acetaminophen 325 mG 2Tablet(s) Oral every 6 hours PRN Severe Pain (7 - 10)

## 2017-04-11 VITALS — DIASTOLIC BLOOD PRESSURE: 80 MMHG | HEART RATE: 80 BPM | SYSTOLIC BLOOD PRESSURE: 128 MMHG

## 2017-04-11 PROCEDURE — 80053 COMPREHEN METABOLIC PANEL: CPT

## 2017-04-11 PROCEDURE — 81001 URINALYSIS AUTO W/SCOPE: CPT

## 2017-04-11 PROCEDURE — 82803 BLOOD GASES ANY COMBINATION: CPT

## 2017-04-11 PROCEDURE — 86703 HIV-1/HIV-2 1 RESULT ANTBDY: CPT

## 2017-04-11 PROCEDURE — 83735 ASSAY OF MAGNESIUM: CPT

## 2017-04-11 PROCEDURE — 83615 LACTATE (LD) (LDH) ENZYME: CPT

## 2017-04-11 PROCEDURE — 86900 BLOOD TYPING SEROLOGIC ABO: CPT

## 2017-04-11 PROCEDURE — 86592 SYPHILIS TEST NON-TREP QUAL: CPT

## 2017-04-11 PROCEDURE — 85730 THROMBOPLASTIN TIME PARTIAL: CPT

## 2017-04-11 PROCEDURE — 86695 HERPES SIMPLEX TYPE 1 TEST: CPT

## 2017-04-11 PROCEDURE — 80307 DRUG TEST PRSMV CHEM ANLYZR: CPT

## 2017-04-11 PROCEDURE — 85384 FIBRINOGEN ACTIVITY: CPT

## 2017-04-11 PROCEDURE — 87340 HEPATITIS B SURFACE AG IA: CPT

## 2017-04-11 PROCEDURE — 86850 RBC ANTIBODY SCREEN: CPT

## 2017-04-11 PROCEDURE — 36415 COLL VENOUS BLD VENIPUNCTURE: CPT

## 2017-04-11 PROCEDURE — 59050 FETAL MONITOR W/REPORT: CPT

## 2017-04-11 PROCEDURE — G0463: CPT

## 2017-04-11 PROCEDURE — 90715 TDAP VACCINE 7 YRS/> IM: CPT

## 2017-04-11 PROCEDURE — 86901 BLOOD TYPING SEROLOGIC RH(D): CPT

## 2017-04-11 PROCEDURE — 86762 RUBELLA ANTIBODY: CPT

## 2017-04-11 PROCEDURE — 88307 TISSUE EXAM BY PATHOLOGIST: CPT

## 2017-04-11 PROCEDURE — 85610 PROTHROMBIN TIME: CPT

## 2017-04-11 PROCEDURE — 84550 ASSAY OF BLOOD/URIC ACID: CPT

## 2017-04-11 PROCEDURE — 85027 COMPLETE CBC AUTOMATED: CPT

## 2017-04-11 RX ORDER — FOLIC ACID 0.8 MG
1 TABLET ORAL
Qty: 0 | Refills: 0 | COMMUNITY

## 2017-04-11 RX ORDER — LABETALOL HCL 100 MG
2 TABLET ORAL
Qty: 84 | Refills: 0 | OUTPATIENT
Start: 2017-04-11 | End: 2017-04-25

## 2017-04-11 RX ORDER — IBUPROFEN 200 MG
1 TABLET ORAL
Qty: 30 | Refills: 0 | OUTPATIENT
Start: 2017-04-11

## 2017-04-11 RX ORDER — NIFEDIPINE 30 MG
1 TABLET, EXTENDED RELEASE 24 HR ORAL
Qty: 14 | Refills: 0 | OUTPATIENT
Start: 2017-04-11 | End: 2017-04-25

## 2017-04-11 RX ORDER — HYDRALAZINE HCL 50 MG
5 TABLET ORAL ONCE
Qty: 0 | Refills: 0 | Status: COMPLETED | OUTPATIENT
Start: 2017-04-11 | End: 2017-04-11

## 2017-04-11 RX ADMIN — Medication 60 MILLIGRAM(S): at 05:33

## 2017-04-11 RX ADMIN — SIMETHICONE 80 MILLIGRAM(S): 80 TABLET, CHEWABLE ORAL at 13:43

## 2017-04-11 RX ADMIN — Medication 600 MILLIGRAM(S): at 13:43

## 2017-04-11 RX ADMIN — Medication 100 MILLIGRAM(S): at 13:43

## 2017-04-11 RX ADMIN — Medication 1 TABLET(S): at 13:43

## 2017-04-11 RX ADMIN — Medication 600 MILLIGRAM(S): at 05:33

## 2017-04-11 RX ADMIN — Medication 325 MILLIGRAM(S): at 13:43

## 2017-04-11 RX ADMIN — Medication 5 MILLIGRAM(S): at 03:38

## 2017-04-11 NOTE — CHART NOTE - NSCHARTNOTEFT_GEN_A_CORE
Breanna Pgy3     Was called to evaluate patient for serous sanguineous leakage at incision site. Patient is POD & s/p csection. Patient is asymptomatic. Incision site is clean with minimal serosanguineous fluid leakage at left mid side of incision site. Dr Dhillon made aware: dressing to be applied. Patient to follow up on leakage and blood pressure at [facility] in 48 hours.

## 2017-04-11 NOTE — PROGRESS NOTE ADULT - ATTENDING COMMENTS
POD#7   Pre-eclampsia  BP's mostly normo to mild range of labetalol/Nifedipine regimen  no symptoms  discharge home today with f/u in the office in 48hrs

## 2017-04-11 NOTE — PROGRESS NOTE ADULT - PROBLEM SELECTOR PLAN 1
Continue current management  Encourage breast pumping while baby in NICU  Encourage ambulation. VCD while inactive.  Plan to d/c if b.p controlled.
s/p c section delivery,   s/p postpartum magnesium sulfate for eclampsia prophylaxis.  BP in normal ranges, well controlled with medication.
Continue current management  Encourage breast pumping while baby in NICU  Encourage ambulation. VCD while inactive.  Plan to d/c tomorrow if b.p controlled.
Continue current management  Encourage breast pumping while baby in NICU  Encourage ambulation. VCD while inactive.  Plan to d/c when b.p controlled.
Continue current management  Encourage breast pumping while baby in NICU  Encourage ambulation. VCD while inactive.  Plan to d/c when b.p controlled.
Continue magnesium sulfate for eclampsia prophylaxis.  Preeclampsia labs including magnesium concentration every 6 hours, once postpartum magnesium completed, may change to every 24 hours.  Serial BP monitoring.  continue procardia for BP control.
continue magnesium x 24 hours  pain management  serial labs

## 2017-04-11 NOTE — PROGRESS NOTE ADULT - PROBLEM SELECTOR PROBLEM 2
Placenta abruption, delivered, current hospitalization
Pre-eclampsia superimposed on chronic hypertension, delivered
Placenta abruption, delivered, current hospitalization
Pre-eclampsia superimposed on chronic hypertension, delivered

## 2017-04-11 NOTE — PROGRESS NOTE ADULT - SUBJECTIVE AND OBJECTIVE BOX
PGY1 Note     Pt is 26y year old  s/p primary  at 32wks gestation for severe pre-eclampsia. POD#7. Patient seen and examined at bedside.  Patient states that her abdominal pain has resolved and there is no longer leakage of fluids.  Patient denies as headaches, dizziness, vision changes, but does report dysuria.  Patient is ambulating, +eating, +PO hydration, +voiding, +Flatus, +BM. mom is +Breast feeding though pumping.     Vital Signs Last 24 Hrs  T(C): 37.1, Max: 37.1 (04-10 @ 16:47)  T(F): 98.7, Max: 98.7 (04-10 @ 16:47)  HR: 71 (62 - 81)  BP: 147/84 (113/76 - 165/101)  BP(mean): --  RR: 18 (18 - 18)  SpO2: 98% (98% - 98%)    Physical Exam:  General: NAD  CVS: RRR, +S1/S2  Lungs: CTAB, no wheeze, rhonchi or rales.   Abdomen: +BS, soft, ND, appropriately tender, Fundus firm at level of umbilicus. Suture noted, minimal fluids from incision site. no signs of infection, healing well  Pelvic: Minimal lochia  Ext: No cyanosis, +1 edema no calf tenderness.   Neuro: AAOx3    MEDICATIONS  (STANDING):  acetaminophen  IVPB. 1000milliGRAM(s) IV Intermittent once  oxytocin Infusion 333.333milliUNIT(s)/Min IV Continuous <Continuous>  oxytocin Infusion 41.667milliUNIT(s)/Min IV Continuous <Continuous>  oxytocin Infusion 41.667milliUNIT(s)/Min IV Continuous <Continuous>  ferrous    sulfate 325milliGRAM(s) Oral daily  prenatal multivitamin 1Tablet(s) Oral daily  oxytocin Infusion 333.333milliUNIT(s)/Min IV Continuous <Continuous>  sodium chloride 0.9% lock flush 3milliLiter(s) IV Push every 8 hours  NIFEdipine XL 60milliGRAM(s) Oral daily  labetalol 600milliGRAM(s) Oral three times a day    MEDICATIONS  (PRN):  naloxone Injectable 0.1milliGRAM(s) IV Push every 3 minutes PRN For ANY of the following changes in patient status:  A. RR LESS THAN 10 breaths per minute, B. Oxygen saturation LESS THAN 90%, C. Sedation score of 6  ondansetron Injectable 4milliGRAM(s) IV Push every 6 hours PRN Nausea  oxyCODONE IR 5milliGRAM(s) Oral every 3 hours PRN Mild Pain  oxyCODONE IR 10milliGRAM(s) Oral every 3 hours PRN Moderate Pain  HYDROmorphone  Injectable 1milliGRAM(s) IV Push every 3 hours PRN Severe Pain  naloxone Injectable 0.1milliGRAM(s) IV Push every 3 minutes PRN For ANY of the following changes in patient status:  A. RR LESS THAN 10 breaths per minute, B. Oxygen saturation LESS THAN 90%, C. Sedation score of 6  ondansetron Injectable 4milliGRAM(s) IV Push every 6 hours PRN Nausea  diphenhydrAMINE   Injectable 25milliGRAM(s) IV Push every 4 hours PRN Pruritus  ondansetron Injectable 4milliGRAM(s) IV Push every 6 hours PRN Postoperative Nausea and  Vomiting  acetaminophen   Tablet 650milliGRAM(s) Oral every 6 hours PRN For Temp greater than 38.5 C (101.3 F)  ibuprofen  Tablet 600milliGRAM(s) Oral every 6 hours PRN Mild pain or headache  oxyCODONE  5 mG/acetaminophen 325 mG 1Tablet(s) Oral every 3 hours PRN Moderate Pain  oxyCODONE  5 mG/acetaminophen 325 mG 2Tablet(s) Oral every 6 hours PRN Severe Pain  simethicone 80milliGRAM(s) Chew every 4 hours PRN Gas  diphenhydrAMINE   Capsule 25milliGRAM(s) Oral every 6 hours PRN Itching  glycerin Suppository - Adult 1Suppository(s) Rectal at bedtime PRN Constipation  docusate sodium 100milliGRAM(s) Oral two times a day PRN Stool Softening  lanolin Ointment 1Application(s) Topical every 3 hours PRN Sore Nipples  oxyCODONE  5 mG/acetaminophen 325 mG 2Tablet(s) Oral every 6 hours PRN Severe Pain (7 - 10) PGY1 Note     Pt is 26y year old  s/p primary  at 32wks gestation for severe pre-eclampsia. POD#7. Patient seen and examined at bedside.  Patient states that her abdominal pain has resolved and there is no longer leakage of fluids.  Patient denies as headaches, dizziness, vision changes.  Patient is ambulating, +eating, +PO hydration, +voiding, +Flatus, +BM. mom is +Breast feeding though pumping.     Vital Signs Last 24 Hrs  T(C): 37.1, Max: 37.1 (04-10 @ 16:47)  T(F): 98.7, Max: 98.7 (04-10 @ 16:47)  HR: 71 (62 - 81)  BP: 147/84 (113/76 - 165/101)  BP(mean): --  RR: 18 (18 - 18)  SpO2: 98% (98% - 98%)    Physical Exam:  General: NAD  CVS: RRR, +S1/S2  Lungs: CTAB, no wheeze, rhonchi or rales.   Abdomen: +BS, soft, ND, appropriately tender, Fundus firm at level of umbilicus. Suture noted, minimal fluids from incision site. no signs of infection, healing well  Pelvic: Minimal lochia  Ext: No cyanosis, +1 edema no calf tenderness.   Neuro: AAOx3    MEDICATIONS  (STANDING):  acetaminophen  IVPB. 1000milliGRAM(s) IV Intermittent once  oxytocin Infusion 333.333milliUNIT(s)/Min IV Continuous <Continuous>  oxytocin Infusion 41.667milliUNIT(s)/Min IV Continuous <Continuous>  oxytocin Infusion 41.667milliUNIT(s)/Min IV Continuous <Continuous>  ferrous    sulfate 325milliGRAM(s) Oral daily  prenatal multivitamin 1Tablet(s) Oral daily  oxytocin Infusion 333.333milliUNIT(s)/Min IV Continuous <Continuous>  sodium chloride 0.9% lock flush 3milliLiter(s) IV Push every 8 hours  NIFEdipine XL 60milliGRAM(s) Oral daily  labetalol 600milliGRAM(s) Oral three times a day    MEDICATIONS  (PRN):  naloxone Injectable 0.1milliGRAM(s) IV Push every 3 minutes PRN For ANY of the following changes in patient status:  A. RR LESS THAN 10 breaths per minute, B. Oxygen saturation LESS THAN 90%, C. Sedation score of 6  ondansetron Injectable 4milliGRAM(s) IV Push every 6 hours PRN Nausea  oxyCODONE IR 5milliGRAM(s) Oral every 3 hours PRN Mild Pain  oxyCODONE IR 10milliGRAM(s) Oral every 3 hours PRN Moderate Pain  HYDROmorphone  Injectable 1milliGRAM(s) IV Push every 3 hours PRN Severe Pain  naloxone Injectable 0.1milliGRAM(s) IV Push every 3 minutes PRN For ANY of the following changes in patient status:  A. RR LESS THAN 10 breaths per minute, B. Oxygen saturation LESS THAN 90%, C. Sedation score of 6  ondansetron Injectable 4milliGRAM(s) IV Push every 6 hours PRN Nausea  diphenhydrAMINE   Injectable 25milliGRAM(s) IV Push every 4 hours PRN Pruritus  ondansetron Injectable 4milliGRAM(s) IV Push every 6 hours PRN Postoperative Nausea and  Vomiting  acetaminophen   Tablet 650milliGRAM(s) Oral every 6 hours PRN For Temp greater than 38.5 C (101.3 F)  ibuprofen  Tablet 600milliGRAM(s) Oral every 6 hours PRN Mild pain or headache  oxyCODONE  5 mG/acetaminophen 325 mG 1Tablet(s) Oral every 3 hours PRN Moderate Pain  oxyCODONE  5 mG/acetaminophen 325 mG 2Tablet(s) Oral every 6 hours PRN Severe Pain  simethicone 80milliGRAM(s) Chew every 4 hours PRN Gas  diphenhydrAMINE   Capsule 25milliGRAM(s) Oral every 6 hours PRN Itching  glycerin Suppository - Adult 1Suppository(s) Rectal at bedtime PRN Constipation  docusate sodium 100milliGRAM(s) Oral two times a day PRN Stool Softening  lanolin Ointment 1Application(s) Topical every 3 hours PRN Sore Nipples  oxyCODONE  5 mG/acetaminophen 325 mG 2Tablet(s) Oral every 6 hours PRN Severe Pain (7 - 10)

## 2017-04-11 NOTE — PROGRESS NOTE ADULT - PROBLEM SELECTOR PLAN 2
S/p c section delivery.  H/H stable.
Stable. Pt asymptomatic at this time  B.P fluctuating   on Nifedipine 60 daily. Labetalol 400mg Q8 hours. B.P slightly elevated overnight Pt to receive AM B.P meds. Will recheck at later time  Mg- 2.0
/84 this morning. Patient's BP appears to be controlled during the day yesterday and would increase at night time. Pt asymptomatic at this time  On Nifedipine 60mg daily. Labetalol 600mg Q8 hours.     As per [facility] Dr Manning, no need for repeat preeclampsia labs as patient has already delivered the baby and this is the definitive treatment.  At this time patient will just need medical management regarding her BP.  Monitor BP closely.  Recommended follow up 48-72 hrs after discharge to monitor BP at the clinic.  Anticipatinig discharge when BP is under control
BP trending up.  164/107 this morning. Pt asymptomatic at this time  On Nifedipine 60 daily. Labetalol 400mg Q8 hours.   Will consult MFM. Redraw pre-eclampsia labs. Will recheck BP at later time
S/p c section delivery.
Stable. Pt asymptomatic at this time  B.P controlled on Nifedipine 20mg Q6. Labetalol 200mg Q12 hours. B.P slightly elevated at last reading. Pt to receive AM B.P meds. Will recheck at later time  Mg- 2.8

## 2017-04-11 NOTE — PROGRESS NOTE ADULT - PROBLEM SELECTOR PROBLEM 1
delivery delivered
Severe preeclampsia
 delivery delivered
Severe preeclampsia
Severe preeclampsia

## 2021-01-06 NOTE — DISCHARGE NOTE OB - PATERNITY PAPERS COMPLETED PRIOR TO DISCHARGE
Action Requested: Summary for Provider     []  Critical Lab, Recommendations Needed  [] Need Additional Advice  []   FYI    []   Need Orders  [] Need Medications Sent to Pharmacy  [x]  Other     SUMMARY: Verified name and .   Patient reports symptoms of
Yes

## 2021-03-12 NOTE — PROVIDER CONTACT NOTE (CHANGE IN STATUS NOTIFICATION) - DATE AND TIME:
Patent taken from [place]ello into bathroom with walker, oxygen to void, assisted back to cart. Patient briefly SOB with exertion. Given a pillow, socks, pillow   07-Apr-2017 12:30 ambulatory